# Patient Record
Sex: MALE | Race: BLACK OR AFRICAN AMERICAN | NOT HISPANIC OR LATINO | URBAN - METROPOLITAN AREA
[De-identification: names, ages, dates, MRNs, and addresses within clinical notes are randomized per-mention and may not be internally consistent; named-entity substitution may affect disease eponyms.]

---

## 2013-06-05 LAB — CRC RECOMMENDATION EXT: NORMAL

## 2017-04-20 ENCOUNTER — TELEPHONE (OUTPATIENT)
Dept: CARDIOLOGY | Facility: CLINIC | Age: 62
End: 2017-04-20

## 2017-04-20 DIAGNOSIS — R07.9 CHEST PAIN, UNSPECIFIED TYPE: Primary | ICD-10-CM

## 2017-04-21 ENCOUNTER — OFFICE VISIT (OUTPATIENT)
Dept: CARDIOLOGY | Facility: CLINIC | Age: 62
End: 2017-04-21
Payer: COMMERCIAL

## 2017-04-21 ENCOUNTER — HOSPITAL ENCOUNTER (OUTPATIENT)
Dept: CARDIOLOGY | Facility: CLINIC | Age: 62
Discharge: HOME | End: 2017-04-21
Payer: COMMERCIAL

## 2017-04-21 VITALS
HEIGHT: 69 IN | BODY MASS INDEX: 21.06 KG/M2 | SYSTOLIC BLOOD PRESSURE: 181 MMHG | DIASTOLIC BLOOD PRESSURE: 95 MMHG | HEART RATE: 71 BPM | WEIGHT: 142.19 LBS

## 2017-04-21 DIAGNOSIS — I10 ESSENTIAL HYPERTENSION: ICD-10-CM

## 2017-04-21 DIAGNOSIS — R07.9 CHEST PAIN, UNSPECIFIED TYPE: ICD-10-CM

## 2017-04-21 DIAGNOSIS — R55 SYNCOPE, UNSPECIFIED SYNCOPE TYPE: Primary | ICD-10-CM

## 2017-04-21 DIAGNOSIS — Z72.0 TOBACCO ABUSE: ICD-10-CM

## 2017-04-21 PROCEDURE — 3077F SYST BP >= 140 MM HG: CPT | Mod: S$GLB,,, | Performed by: INTERNAL MEDICINE

## 2017-04-21 PROCEDURE — 99999 PR PBB SHADOW E&M-EST. PATIENT-LVL III: CPT | Mod: PBBFAC,,, | Performed by: INTERNAL MEDICINE

## 2017-04-21 PROCEDURE — 3080F DIAST BP >= 90 MM HG: CPT | Mod: S$GLB,,, | Performed by: INTERNAL MEDICINE

## 2017-04-21 PROCEDURE — 93000 ELECTROCARDIOGRAM COMPLETE: CPT | Mod: S$GLB,,, | Performed by: INTERNAL MEDICINE

## 2017-04-21 PROCEDURE — 99204 OFFICE O/P NEW MOD 45 MIN: CPT | Mod: S$GLB,,, | Performed by: INTERNAL MEDICINE

## 2017-04-21 RX ORDER — AMLODIPINE BESYLATE 10 MG/1
10 TABLET ORAL DAILY
Qty: 30 TABLET | Refills: 11 | Status: SHIPPED | OUTPATIENT
Start: 2017-04-21

## 2017-04-21 RX ORDER — AMLODIPINE BESYLATE 10 MG/1
TABLET ORAL
COMMUNITY
Start: 2017-04-02 | End: 2017-04-21 | Stop reason: SDUPTHER

## 2017-04-21 RX ORDER — HYDROCHLOROTHIAZIDE 25 MG/1
25 TABLET ORAL DAILY
Qty: 30 TABLET | Refills: 11 | Status: SHIPPED | OUTPATIENT
Start: 2017-04-21 | End: 2017-06-13 | Stop reason: DRUGHIGH

## 2017-04-21 RX ORDER — OXYCODONE AND ACETAMINOPHEN 5; 325 MG/1; MG/1
TABLET ORAL
COMMUNITY
Start: 2017-04-02 | End: 2018-03-19 | Stop reason: SDUPTHER

## 2017-04-21 NOTE — PROGRESS NOTES
"Subjective:    Patient ID:  Krzysztof Pryor Sr. is a 62 y.o. male who presents for evaluation of Loss of Consciousness (Hospital d/c 4/1/17 [facility] ); Chest Pain (sharp pain ); and Dehydration      HPI  63 y/o AAM with hx of HTN here for f/u of syncopal episode. Seen at  after  working out in sun draining a pool when he progressively began to feel weak and then stood up to go get some water and passed out. Hit his heat and Ribs on L side. CT and Xray unrevealing and pt told he was dehydrated and given 3L NS. This is all per pt report as no documents available for review. Denies CP, palpitations, SOB, orthopnea or PND. States he is very active and never had CP or SOB prior to this event. He had normal Nuclear Stress last year at  just "for a checkup". He had previously been noncompliant with BP meds but since syncopal episode started taking Norvasc. Doesn't check BP. +tob. No family hx of MI or CVA.    Review of Systems   Constitution: Negative for chills and fever.   HENT: Negative for hoarse voice and sore throat.    Eyes: Negative for pain and redness.   Cardiovascular: Negative for chest pain, dyspnea on exertion, leg swelling and orthopnea.   Respiratory: Negative for cough and shortness of breath.    Endocrine: Negative for polydipsia and polyuria.   Hematologic/Lymphatic: Negative for bleeding problem. Does not bruise/bleed easily.   Skin: Negative for flushing and itching.   Musculoskeletal: Negative for joint pain and joint swelling.   Gastrointestinal: Negative for hematemesis, hematochezia and melena.   Genitourinary: Negative for dysuria and hematuria.   Neurological: Negative for light-headedness and loss of balance.        Objective:    Physical Exam   Constitutional: He is oriented to person, place, and time. He appears well-developed and well-nourished.   HENT:   Head: Normocephalic and atraumatic.   Mouth/Throat: No oropharyngeal exudate.   Eyes: EOM are normal. Pupils are equal, round, " and reactive to light.   Neck: Normal range of motion. Neck supple. No JVD present.   Cardiovascular: Normal rate and regular rhythm.  Exam reveals no friction rub.    No murmur heard.  Pulses:       Carotid pulses are 2+ on the right side, and 2+ on the left side.       Radial pulses are 2+ on the right side, and 2+ on the left side.        Femoral pulses are 2+ on the right side, and 2+ on the left side.       Dorsalis pedis pulses are 2+ on the right side, and 2+ on the left side.        Posterior tibial pulses are 2+ on the right side, and 2+ on the left side.   Pulmonary/Chest: Effort normal and breath sounds normal. No respiratory distress.   Abdominal: Soft. Bowel sounds are normal. He exhibits no distension.   Musculoskeletal: Normal range of motion. He exhibits no edema.   Neurological: He is alert and oriented to person, place, and time.   Skin: No rash noted.         Assessment:       Sycnope  - Sounds orthostatic in nature from dehydration. EKG with leftward axis and LAFB. No other hx of syncope, angina, or palpitations  - Will check 2D echo to ensure structurally normal heart    HTN  - SBP in 180's today but did not take Norvasc  - Rec cont norvasc and will add HCTZ  - Counseled on adequate hydration  - Check CBC, CMP, TSH for baseline    Primary prevention  - Check lipids to calculate ASCVD  - Rec Smoking Cessation and cont diet and exercise      RTC in 2 months for BP f/u. Will call with lab results

## 2017-04-21 NOTE — MR AVS SNAPSHOT
[facility]  [address]  [facility] [address]  Phone: [phone]                  Krzysztof Pryor Sr.   2017 10:00 AM   Office Visit    Description:  Male : 1955   Provider:  Jose Alfredo Bartlett MD   Department:  [facility]           Reason for Visit     Loss of Consciousness     Chest Pain     Dehydration           Diagnoses this Visit        Comments    Syncope, unspecified syncope type    -  Primary     Essential hypertension                To Do List           Goals (5 Years of Data)     None      Follow-Up and Disposition     Return in about 2 months (around 2017).       These Medications        Disp Refills Start End    hydrochlorothiazide (HYDRODIURIL) 25 MG tablet 30 tablet 11 2017     Take 1 tablet (25 mg total) by mouth once daily. - Oral    Pharmacy: [facility] [address] [place] [address] #: [phone]       amlodipine (NORVASC) 10 MG tablet 30 tablet 11 2017     Take 1 tablet (10 mg total) by mouth once daily. - Oral    Pharmacy: [facility] [address] AT [facility] Ph #: [phone]         [facility] On Call     [facility] Nurse Care Line - 24/ Assistance  Unless otherwise directed by your provider, please contact [facility], our nurse care line that is available for 24/7 assistance.     Registered nurses in the [facility] provide: appointment scheduling, clinical advisement, health education, and other advisory services.  Call: [phone] (toll free)               Medications           Message regarding Medications     Verify the changes and/or additions to your medication regime listed below are the same as discussed with your clinician today.  If any of these changes or additions are incorrect, please notify your healthcare provider.        START taking these NEW medications   "      Refills    hydrochlorothiazide (HYDRODIURIL) 25 MG tablet 11    Sig: Take 1 tablet (25 mg total) by mouth once daily.    Class: Normal    Route: Oral    amlodipine (NORVASC) 10 MG tablet 11    Sig: Take 1 tablet (10 mg total) by mouth once daily.    Class: Normal    Route: Oral           Verify that the below list of medications is an accurate representation of the medications you are currently taking.  If none reported, the list may be blank. If incorrect, please contact your healthcare provider. Carry this list with you in case of emergency.           Current Medications     amlodipine (NORVASC) 10 MG tablet Take 1 tablet (10 mg total) by mouth once daily.    oxycodone-acetaminophen (PERCOCET) 5-325 mg per tablet     hydrochlorothiazide (HYDRODIURIL) 25 MG tablet Take 1 tablet (25 mg total) by mouth once daily.           Clinical Reference Information           Your Vitals Were     BP Pulse Height Weight BMI    181/95 (BP Location: Left arm, Patient Position: Sitting, BP Method: Automatic) 71 5' 9" (1.753 m) 64.5 kg (142 lb 3.2 oz) 21 kg/m2      Blood Pressure          Most Recent Value    Right Arm BP - Sitting  184/87    Left Arm BP - Sitting  181/95    BP  (!)  181/95      Allergies as of 4/21/2017     Iodine And Iodide Containing Products      Immunizations Administered on Date of Encounter - 4/21/2017     None      Orders Placed During Today's Visit     Future Labs/Procedures Expected by Expires    CBC auto differential  4/21/2017 7/20/2017    Comprehensive metabolic panel  4/21/2017 7/20/2017    Magnesium  4/21/2017 7/20/2017    TSH  4/21/2017 6/20/2018    Lipid panel  6/2/2017 (Approximate) 7/2/2017    2D Echo w/ Color Flow Doppler  As directed 4/21/2018      [facility] Sign-Up     Activating your [facility] account is as easy as 1-2-3!     1) Visit [email or web address], select Sign Up Now, enter this activation code and your date of birth, then select Next.  [email or web address]  Expires: 6/5/2017  8:52 AM  "     2) Create a username and password to use when you visit [facility] in the future and select a security question in case you lose your password and select Next.    3) Enter your e-mail address and click Sign Up!    Additional Information  If you have questions, please e-mail [email or web address] or call [phone] to talk to our [facility] staff. Remember, [facility] is NOT to be used for urgent needs. For medical emergencies, dial 911.         Smoking Cessation     If you would like to quit smoking:   You may be eligible for free services if you are a [place] resident and started smoking cigarettes before September 1, 1988.  Call the Smoking Cessation Trust ([facility]) toll free at ([phone] 671-5332 or (268) 275-9537.   Call -800-QUIT-NOW if you do not meet the above criteria.   Contact us via email: [email or web address]   View our website for more information: [email or web address]        Language Assistance Services     ATTENTION: Language assistance services are available, free of charge. Please call [phone].      ATENCIÓN: Si habla español, tiene a tran disposición servicios gratuitos de asistencia lingüística. Llame al [phone].     BING Ý: N?u b?n nói Ti?ng Vi?t, có các d?ch v? h? tr? ngôn ng? mi?n phí dành cho b?n. G?i s? [phone].         [facility] complies with applicable Federal civil rights laws and does not discriminate on the basis of race, color, national origin, age, disability, or sex.

## 2017-04-24 ENCOUNTER — LAB VISIT (OUTPATIENT)
Dept: LAB | Facility: HOSPITAL | Age: 62
End: 2017-04-24
Payer: COMMERCIAL

## 2017-04-24 DIAGNOSIS — R55 SYNCOPE, UNSPECIFIED SYNCOPE TYPE: ICD-10-CM

## 2017-04-24 DIAGNOSIS — I10 ESSENTIAL HYPERTENSION: ICD-10-CM

## 2017-04-24 LAB
ALBUMIN SERPL BCP-MCNC: 4.1 G/DL
ALP SERPL-CCNC: 141 U/L
ALT SERPL W/O P-5'-P-CCNC: 14 U/L
ANION GAP SERPL CALC-SCNC: 9 MMOL/L
AST SERPL-CCNC: 26 U/L
BASOPHILS # BLD AUTO: 0.02 K/UL
BASOPHILS NFR BLD: 0.4 %
BILIRUB SERPL-MCNC: 0.5 MG/DL
BUN SERPL-MCNC: 11 MG/DL
CALCIUM SERPL-MCNC: 9.8 MG/DL
CHLORIDE SERPL-SCNC: 105 MMOL/L
CHOLEST/HDLC SERPL: 3.5 {RATIO}
CO2 SERPL-SCNC: 27 MMOL/L
CREAT SERPL-MCNC: 1 MG/DL
DIFFERENTIAL METHOD: ABNORMAL
EOSINOPHIL # BLD AUTO: 0.1 K/UL
EOSINOPHIL NFR BLD: 1.8 %
ERYTHROCYTE [DISTWIDTH] IN BLOOD BY AUTOMATED COUNT: 12.9 %
EST. GFR  (AFRICAN AMERICAN): >60 ML/MIN/1.73 M^2
EST. GFR  (NON AFRICAN AMERICAN): >60 ML/MIN/1.73 M^2
GLUCOSE SERPL-MCNC: 94 MG/DL
HCT VFR BLD AUTO: 37.3 %
HDL/CHOLESTEROL RATIO: 28.3 %
HDLC SERPL-MCNC: 173 MG/DL
HDLC SERPL-MCNC: 49 MG/DL
HGB BLD-MCNC: 12.8 G/DL
LDLC SERPL CALC-MCNC: 102.2 MG/DL
LYMPHOCYTES # BLD AUTO: 1.7 K/UL
LYMPHOCYTES NFR BLD: 35.1 %
MAGNESIUM SERPL-MCNC: 2.4 MG/DL
MCH RBC QN AUTO: 32.5 PG
MCHC RBC AUTO-ENTMCNC: 34.3 %
MCV RBC AUTO: 95 FL
MONOCYTES # BLD AUTO: 0.6 K/UL
MONOCYTES NFR BLD: 11.1 %
NEUTROPHILS # BLD AUTO: 2.6 K/UL
NEUTROPHILS NFR BLD: 51.6 %
NONHDLC SERPL-MCNC: 124 MG/DL
PLATELET # BLD AUTO: 246 K/UL
PMV BLD AUTO: 10 FL
POTASSIUM SERPL-SCNC: 4.5 MMOL/L
PROT SERPL-MCNC: 7.9 G/DL
RBC # BLD AUTO: 3.94 M/UL
SODIUM SERPL-SCNC: 141 MMOL/L
TRIGL SERPL-MCNC: 109 MG/DL
TSH SERPL DL<=0.005 MIU/L-ACNC: 0.93 UIU/ML
WBC # BLD AUTO: 4.96 K/UL

## 2017-04-24 PROCEDURE — 80053 COMPREHEN METABOLIC PANEL: CPT

## 2017-04-24 PROCEDURE — 84443 ASSAY THYROID STIM HORMONE: CPT

## 2017-04-24 PROCEDURE — 83735 ASSAY OF MAGNESIUM: CPT

## 2017-04-24 PROCEDURE — 36415 COLL VENOUS BLD VENIPUNCTURE: CPT

## 2017-04-24 PROCEDURE — 80061 LIPID PANEL: CPT

## 2017-04-24 PROCEDURE — 85025 COMPLETE CBC W/AUTO DIFF WBC: CPT

## 2017-04-28 ENCOUNTER — TELEPHONE (OUTPATIENT)
Dept: CARDIOLOGY | Facility: HOSPITAL | Age: 62
End: 2017-04-28

## 2017-04-28 DIAGNOSIS — I10 ESSENTIAL HYPERTENSION: Primary | ICD-10-CM

## 2017-04-28 RX ORDER — ATORVASTATIN CALCIUM 40 MG/1
40 TABLET, FILM COATED ORAL DAILY
Qty: 30 TABLET | Refills: 11 | Status: SHIPPED | OUTPATIENT
Start: 2017-04-28

## 2017-04-28 NOTE — TELEPHONE ENCOUNTER
Discussed labwork with pt. ASCVD=14.7% so will start lipitor 40mg QD. Will est care with PCP for anemia

## 2017-05-02 ENCOUNTER — HOSPITAL ENCOUNTER (OUTPATIENT)
Dept: CARDIOLOGY | Facility: CLINIC | Age: 62
Discharge: HOME | End: 2017-05-02
Payer: COMMERCIAL

## 2017-05-02 DIAGNOSIS — R55 SYNCOPE, UNSPECIFIED SYNCOPE TYPE: ICD-10-CM

## 2017-05-02 LAB
DIASTOLIC DYSFUNCTION: NO
ESTIMATED PA SYSTOLIC PRESSURE: 19.16
MITRAL VALVE MOBILITY: NORMAL
RETIRED EF AND QEF - SEE NOTES: 55 (ref 55–65)
TRICUSPID VALVE REGURGITATION: NORMAL

## 2017-05-02 PROCEDURE — 93306 TTE W/DOPPLER COMPLETE: CPT | Mod: S$GLB,,, | Performed by: INTERNAL MEDICINE

## 2017-05-03 ENCOUNTER — OFFICE VISIT (OUTPATIENT)
Dept: INTERNAL MEDICINE | Facility: CLINIC | Age: 62
End: 2017-05-03
Payer: COMMERCIAL

## 2017-05-03 VITALS
SYSTOLIC BLOOD PRESSURE: 156 MMHG | HEIGHT: 69 IN | DIASTOLIC BLOOD PRESSURE: 86 MMHG | HEART RATE: 90 BPM | BODY MASS INDEX: 20.9 KG/M2 | WEIGHT: 141.13 LBS

## 2017-05-03 DIAGNOSIS — I10 ESSENTIAL HYPERTENSION: ICD-10-CM

## 2017-05-03 DIAGNOSIS — D64.9 ANEMIA, UNSPECIFIED TYPE: ICD-10-CM

## 2017-05-03 DIAGNOSIS — Z76.89 ENCOUNTER TO ESTABLISH CARE WITH NEW DOCTOR: Primary | ICD-10-CM

## 2017-05-03 DIAGNOSIS — R63.4 WEIGHT LOSS: ICD-10-CM

## 2017-05-03 LAB
CREAT UR-MCNC: 225 MG/DL
MICROALBUMIN UR DL<=1MG/L-MCNC: 22 UG/ML
MICROALBUMIN/CREATININE RATIO: 9.8 UG/MG

## 2017-05-03 PROCEDURE — 3077F SYST BP >= 140 MM HG: CPT | Mod: S$GLB,,,

## 2017-05-03 PROCEDURE — 3079F DIAST BP 80-89 MM HG: CPT | Mod: S$GLB,,,

## 2017-05-03 PROCEDURE — 1160F RVW MEDS BY RX/DR IN RCRD: CPT | Mod: S$GLB,,,

## 2017-05-03 PROCEDURE — 99204 OFFICE O/P NEW MOD 45 MIN: CPT | Mod: S$GLB,,,

## 2017-05-03 PROCEDURE — 82570 ASSAY OF URINE CREATININE: CPT

## 2017-05-03 PROCEDURE — 99999 PR PBB SHADOW E&M-EST. PATIENT-LVL III: CPT | Mod: PBBFAC,,,

## 2017-05-03 RX ORDER — LISINOPRIL 5 MG/1
5 TABLET ORAL DAILY
Qty: 90 TABLET | Refills: 3 | Status: SHIPPED | OUTPATIENT
Start: 2017-05-03 | End: 2017-06-13 | Stop reason: ALTCHOICE

## 2017-05-03 NOTE — MR AVS SNAPSHOT
[facility]  [address]  [place] [address]  Phone: [phone]  Fax: [phone]                  Krzysztof Pryor SrMilla   5/3/2017 2:15 PM   Office Visit    Description:  Male : 1955   Provider:  Quinton Tucker MD   Department:  [facility]           Reason for Visit     Establish Care           Diagnoses this Visit        Comments    Encounter to establish care with new doctor    -  Primary     Essential hypertension         Anemia, unspecified type         Weight loss                To Do List           Goals (5 Years of Data)     None      Follow-Up and Disposition     Return in about 6 weeks (around 2017) for BP check.       These Medications        Disp Refills Start End    lisinopril (PRINIVIL,ZESTRIL) 5 MG tablet 90 tablet 3 5/3/2017 5/3/2018    Take 1 tablet (5 mg total) by mouth once daily. - Oral    Pharmacy: [facility] [address] [place] [address] AT [facility] #: [phone]         [facility]     [facility] On Call Nurse Care Line -  Assistance  Unless otherwise directed by your provider, please contact [facility], our nurse care line that is available for  assistance.     Registered nurses in the [facility] provide: appointment scheduling, clinical advisement, health education, and other advisory services.  Call: [phone] (toll free)               Medications           Message regarding Medications     Verify the changes and/or additions to your medication regime listed below are the same as discussed with your clinician today.  If any of these changes or additions are incorrect, please notify your healthcare provider.        START taking these NEW medications        Refills    lisinopril (PRINIVIL,ZESTRIL) 5 MG tablet 3    Sig: Take 1 tablet (5 mg total) by mouth once daily.    Class: Normal    Route: Oral           Verify that the below list of  "medications is an accurate representation of the medications you are currently taking.  If none reported, the list may be blank. If incorrect, please contact your healthcare provider. Carry this list with you in case of emergency.           Current Medications     amlodipine (NORVASC) 10 MG tablet Take 1 tablet (10 mg total) by mouth once daily.    atorvastatin (LIPITOR) 40 MG tablet Take 1 tablet (40 mg total) by mouth once daily.    hydrochlorothiazide (HYDRODIURIL) 25 MG tablet Take 1 tablet (25 mg total) by mouth once daily.    lisinopril (PRINIVIL,ZESTRIL) 5 MG tablet Take 1 tablet (5 mg total) by mouth once daily.    oxycodone-acetaminophen (PERCOCET) 5-325 mg per tablet            Clinical Reference Information           Your Vitals Were     BP Pulse Height Weight BMI    156/86 (BP Location: Right arm, Patient Position: Sitting, BP Method: Automatic) 90 5' 9" (1.753 m) 64 kg (141 lb 1.5 oz) 20.84 kg/m2      Blood Pressure          Most Recent Value    BP  (!)  156/86      Allergies as of 5/3/2017     Iodine And Iodide Containing Products      Immunizations Administered on Date of Encounter - 5/3/2017     Name Date Dose VIS Date Route    Pneumococcal Polysaccharide - 23 Valent  Incomplete 0.5 mL 4/24/2015 Intramuscular    TD  Incomplete 0.5 mL 2/24/2015 Intramuscular      Orders Placed During Today's Visit      Normal Orders This Visit    Ambulatory referral to Infectious Disease Injection Dept     MICROALBUMIN / CREATININE RATIO URINE     Pneumococcal Polysaccharide Vaccine (23 Valent) (SQ/IM)     Td Vaccinie     Future Labs/Procedures Expected by Expires    CT Chest Lung Screening Low Dose  5/3/2017 5/3/2018    Ferritin  5/3/2017 7/2/2018    HEPATITIS C ANTIBODY  5/3/2017 7/2/2018    IRON AND TIBC  5/3/2017 7/2/2018    Occult blood x 1, stool  5/3/2017 5/3/2018    Occult blood x 1, stool  5/3/2017 5/3/2018    Occult blood x 1, stool  5/3/2017 5/3/2018    Vitamin D  5/3/2017 7/2/2018    BASIC METABOLIC PANEL  " 6/7/2017 7/2/2018      Smoking Cessation     If you would like to quit smoking:   You may be eligible for free services if you are a [place] resident and started smoking cigarettes before September 1, 1988.  Call the Smoking Cessation Trust ([facility]) toll free at ([phone] or [phone]) [phone].   Call 1-800-QUIT-NOW if you do not meet the above criteria.   Contact us via email: [email or web address]   View our website for more information: [email or web address]        Language Assistance Services     ATTENTION: Language assistance services are available, free of charge. Please call [phone].      ATENCIÓN: Si habla español, tiene a tran disposición servicios gratuitos de asistencia lingüística. Llame al [phone].     CHÚ Ý: N?u b?n nói Ti?ng Vi?t, có các d?ch v? h? tr? ngôn ng? mi?n phí dành cho b?n. G?i s? [phone].         [facility] complies with applicable Federal civil rights laws and does not discriminate on the basis of race, color, national origin, age, disability, or sex.

## 2017-05-03 NOTE — PROGRESS NOTES
I have personally interviewed and examined this patient and agree with resident's note as below.   Mirela Tucker MD

## 2017-05-03 NOTE — PROGRESS NOTES
"Subjective:       Patient ID: Krzysztof Pryor Sr. is a 62 y.o. male.    Chief Complaint: Establish Care    HPI     Krzysztof Pryor Sr. is a 62 y.o. male with a pMHx of hypertension who presents to [facility] for establishment of care.  He recently was seen by [facility] cardiology in regards to a syncopal event, which was ruled likely due to dehydration.  Labs at that time revealed an anemia of 12.6, where he was referred to [facility] primary care for further workup.  He states that he is feeling well without significant changes.  He does however note a 20 pound weight loss within the past year.  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  He notes that while he is more physically active, it does not relate to his weight loss.  He denies any appetite changes, night sweats, fevers, or other constitutional symptoms.  He has had a colonoscopy 1 year ago at , which he states was "okay", but may have had a 5 year follow up.  In addition, he has smoked 0.5 packs of cigarettes for 45 years.      Review of Systems   Constitutional: Positive for unexpected weight change. Negative for appetite change, fatigue and fever.   HENT: Negative for sinus pressure and sore throat.    Respiratory: Negative for cough, chest tightness, shortness of breath and wheezing.    Cardiovascular: Negative for chest pain and palpitations.   Gastrointestinal: Negative for abdominal distention, abdominal pain, blood in stool, constipation and diarrhea.   Endocrine: Negative for cold intolerance, heat intolerance, polydipsia and polyphagia.   Genitourinary: Negative for difficulty urinating, dysuria and hematuria.   Musculoskeletal: Positive for arthralgias (R rib pain from syncopal event leading to fall). Negative for back pain.   Skin: Negative for pallor and rash.   Neurological: Positive for syncope (once on 4/1/2017). Negative for weakness, light-headedness and headaches. " "  Hematological: Negative for adenopathy. Does not bruise/bleed easily.   Psychiatric/Behavioral: Negative for agitation and confusion.       Past Medical History:   Diagnosis Date    Hypertension        No past surgical history on file.    Social History     Social History    Marital status:      Spouse name: N/A    Number of children: N/A    Years of education: N/A     Occupational History    retired     law enforcement       Social History Main Topics    Smoking status: Current Every Day Smoker     Packs/day: 0.50     Years: 45.00    Smokeless tobacco: None    Alcohol use 7.2 oz/week     12 Cans of beer per week      Comment: social    Drug use: No    Sexual activity: Yes     Partners: Female     Other Topics Concern    None     Social History Narrative    None       Family History   Problem Relation Age of Onset    Diabetes Mother     Cancer Father     Cancer Sister     No Known Problems Paternal Grandmother        Review of patient's allergies indicates:   Allergen Reactions    Iodine and iodide containing products Hives       Objective:       Vitals:    05/03/17 1345   BP: (!) 156/86   BP Location: Right arm   Patient Position: Sitting   BP Method: Automatic   Pulse: 90   Weight: 64 kg (141 lb 1.5 oz)   Height: 5' 9" (1.753 m)       Physical Exam   Constitutional: He is oriented to person, place, and time. He appears well-developed and well-nourished. No distress.   HENT:   Head: Normocephalic and atraumatic.   Right Ear: External ear normal.   Left Ear: External ear normal.   Mouth/Throat: No oropharyngeal exudate.   Eyes: Pupils are equal, round, and reactive to light. No scleral icterus.   Neck: Normal range of motion. No tracheal deviation present. No thyromegaly present.   Cardiovascular: Normal rate, regular rhythm and normal heart sounds.    Pulmonary/Chest: Effort normal and breath sounds normal. No respiratory distress. He has no wheezes.   Abdominal: Soft. Bowel sounds are " normal. He exhibits no distension. There is no tenderness.   Musculoskeletal: Normal range of motion. He exhibits no edema.   No tenderness to R rib    Lymphadenopathy:     He has no cervical adenopathy.   Neurological: He is alert and oriented to person, place, and time. No cranial nerve deficit.   Skin: Skin is warm and dry. He is not diaphoretic. No erythema.   Psychiatric: He has a normal mood and affect. His behavior is normal.   Vitals reviewed.      Results for LILIYA RIOS SR. (MRN [id number]) as of 5/3/2017 14:59   Ref. Range 4/21/2017 08:55 4/24/2017 07:35 5/2/2017 13:00   WBC Latest Ref Range: 3.90 - 12.70 K/uL  4.96    RBC Latest Ref Range: 4.60 - 6.20 M/uL  3.94 (L)    Hemoglobin Latest Ref Range: 14.0 - 18.0 g/dL  12.8 (L)    Hematocrit Latest Ref Range: 40.0 - 54.0 %  37.3 (L)    MCV Latest Ref Range: 82 - 98 fL  95    MCH Latest Ref Range: 27.0 - 31.0 pg  32.5 (H)    MCHC Latest Ref Range: 32.0 - 36.0 %  34.3    RDW Latest Ref Range: 11.5 - 14.5 %  12.9    Platelets Latest Ref Range: 150 - 350 K/uL  246    MPV Latest Ref Range: 9.2 - 12.9 fL  10.0    Gran% Latest Ref Range: 38.0 - 73.0 %  51.6    Gran # Latest Ref Range: 1.8 - 7.7 K/uL  2.6    Lymph% Latest Ref Range: 18.0 - 48.0 %  35.1    Lymph # Latest Ref Range: 1.0 - 4.8 K/uL  1.7    Mono% Latest Ref Range: 4.0 - 15.0 %  11.1    Mono # Latest Ref Range: 0.3 - 1.0 K/uL  0.6    Eosinophil% Latest Ref Range: 0.0 - 8.0 %  1.8    Eos # Latest Ref Range: 0.0 - 0.5 K/uL  0.1    Basophil% Latest Ref Range: 0.0 - 1.9 %  0.4    Baso # Latest Ref Range: 0.00 - 0.20 K/uL  0.02    Sodium Latest Ref Range: 136 - 145 mmol/L  141    Potassium Latest Ref Range: 3.5 - 5.1 mmol/L  4.5    Chloride Latest Ref Range: 95 - 110 mmol/L  105    CO2 Latest Ref Range: 23 - 29 mmol/L  27    Anion Gap Latest Ref Range: 8 - 16 mmol/L  9    BUN, Bld Latest Ref Range: 8 - 23 mg/dL  11    Creatinine Latest Ref Range: 0.5 - 1.4 mg/dL  1.0    eGFR if non  Latest Ref  Range: >60 mL/min/1.73 m^2  >60.0    eGFR if African American Latest Ref Range: >60 mL/min/1.73 m^2  >60.0    Glucose Latest Ref Range: 70 - 110 mg/dL  94    Calcium Latest Ref Range: 8.7 - 10.5 mg/dL  9.8    Magnesium Latest Ref Range: 1.6 - 2.6 mg/dL  2.4    Alkaline Phosphatase Latest Ref Range: 55 - 135 U/L  141 (H)    Total Protein Latest Ref Range: 6.0 - 8.4 g/dL  7.9    Albumin Latest Ref Range: 3.5 - 5.2 g/dL  4.1    Total Bilirubin Latest Ref Range: 0.1 - 1.0 mg/dL  0.5    AST Latest Ref Range: 10 - 40 U/L  26    ALT Latest Ref Range: 10 - 44 U/L  14    Triglycerides Latest Ref Range: 30 - 150 mg/dL  109    Cholesterol Latest Ref Range: 120 - 199 mg/dL  173    HDL Latest Ref Range: 40 - 75 mg/dL  49    LDL Cholesterol Latest Ref Range: 63.0 - 159.0 mg/dL  102.2    Total Cholesterol/HDL Ratio Latest Ref Range: 2.0 - 5.0   3.5    TSH Latest Ref Range: 0.400 - 4.000 uIU/mL  0.934        2D echo 5/1/2017    Results for KRZYSZTOF RIOS SR. (MRN [id number]) as of 5/3/2017 14:59   Ref. Range 4/21/2017 08:55 4/24/2017 07:35 5/2/2017 13:00   Diastolic Dysfunction Unknown   No   Differential Method Unknown  Automated    EF Latest Ref Range: 55 - 65    55   HDL/Chol Ratio Latest Ref Range: 20.0 - 50.0 %  28.3    Mitral Valve Mobility Unknown   NORMAL   Non-HDL Cholesterol Latest Units: mg/dL  124    Est. PA Systolic Pressure Unknown   19.16   Tricuspid Valve Regurgitation Unknown   TRIVIAL       Assessment:       1. Encounter to establish care with new doctor    2. Essential hypertension    3. Anemia, unspecified type    4. Weight loss    5.      Tobacco Use   Plan:       Krzysztof was seen today for establish care.    Diagnoses and all orders for this visit:    Encounter to establish care with new doctor  -     CT Chest Lung Screening Low Dose; Future  -     Vitamin D; Future  -     BASIC METABOLIC PANEL; Future  -     Td Vaccinie  -     Pneumococcal Polysaccharide Vaccine (23 Valent) (SQ/IM)  -     Ambulatory referral to  Infectious Disease Injection Dept  -     HEPATITIS C ANTIBODY; Future  -     Occult blood x 1, stool; Future  -     Occult blood x 1, stool; Future  -     Occult blood x 1, stool; Future  - Establishing care at [facility].  Will order as noted above.  Ct chest as patient has 30+ years of smoking.  Vitamin D to assess Alk Phos elevation of 145.  BMP as patient now starting lisinopril, sent to pharmacy.  Will obtain records from  via patient regarding colonscopy, patient to present information in 6 week follow up.  For now, will order hemeoccult and have patient return cards to lab.  Will also order hepatitis C antibody for health screen and iron panel to work up anemia.  Will have patient follow up in 6 weeks to assess BP control    Essential hypertension  -     MICROALBUMIN / CREATININE RATIO URINE  -     lisinopril (PRINIVIL,ZESTRIL) 5 MG tablet; Take 1 tablet (5 mg total) by mouth once daily.  - Will have patient follow up in 6 weeks for noted BP control.      Anemia, unspecified type  -     IRON AND TIBC; Future  -     Ferritin; Future  -     Occult blood x 1, stool; Future  -     Occult blood x 1, stool; Future  -     Occult blood x 1, stool; Future  - Normocytic anemia of unknown cause.  Will order iron panel and hemeoccult.  Colonoscopy per patient's previous notes, or at least provide name of provider who performed exam.  Will request documentation at next visit otherwise.      Weight loss  -     CT Chest Lung Screening Low Dose; Future  - Given 20 lb weight loss and smoking history, will have low dose CT chest for screening purposes.     RTC in 6 weeks for assessment of BP control.    Discussed with Staff, Dr. Tucker.     Quinton Tucker MD  Internal Medicine PGY-1  [phone]

## 2017-05-09 ENCOUNTER — HOSPITAL ENCOUNTER (OUTPATIENT)
Dept: RADIOLOGY | Facility: HOSPITAL | Age: 62
Discharge: HOME | End: 2017-05-09

## 2017-05-09 ENCOUNTER — TELEPHONE (OUTPATIENT)
Dept: INTERNAL MEDICINE | Facility: CLINIC | Age: 62
End: 2017-05-09

## 2017-05-09 ENCOUNTER — LAB VISIT (OUTPATIENT)
Dept: LAB | Facility: HOSPITAL | Age: 62
End: 2017-05-09
Payer: COMMERCIAL

## 2017-05-09 ENCOUNTER — CLINICAL SUPPORT (OUTPATIENT)
Dept: INFECTIOUS DISEASES | Facility: CLINIC | Age: 62
End: 2017-05-09
Payer: COMMERCIAL

## 2017-05-09 DIAGNOSIS — Z76.89 ENCOUNTER TO ESTABLISH CARE WITH NEW DOCTOR: ICD-10-CM

## 2017-05-09 DIAGNOSIS — D64.9 ANEMIA, UNSPECIFIED TYPE: ICD-10-CM

## 2017-05-09 DIAGNOSIS — R63.4 WEIGHT LOSS: ICD-10-CM

## 2017-05-09 DIAGNOSIS — Z23 NEED FOR VACCINATION: Primary | ICD-10-CM

## 2017-05-09 LAB
OB PNL STL: NEGATIVE

## 2017-05-09 PROCEDURE — 82272 OCCULT BLD FECES 1-3 TESTS: CPT | Mod: 91

## 2017-05-09 PROCEDURE — 99999 PR PBB SHADOW E&M-EST. PATIENT-LVL I: CPT | Mod: PBBFAC,,,

## 2017-05-09 PROCEDURE — 90471 IMMUNIZATION ADMIN: CPT | Mod: S$GLB,,, | Performed by: INTERNAL MEDICINE

## 2017-05-09 PROCEDURE — 90472 IMMUNIZATION ADMIN EACH ADD: CPT | Mod: S$GLB,,, | Performed by: INTERNAL MEDICINE

## 2017-05-09 PROCEDURE — 90732 PPSV23 VACC 2 YRS+ SUBQ/IM: CPT | Mod: S$GLB,,, | Performed by: INTERNAL MEDICINE

## 2017-05-09 PROCEDURE — 90714 TD VACC NO PRESV 7 YRS+ IM: CPT | Mod: S$GLB,,, | Performed by: INTERNAL MEDICINE

## 2017-05-09 PROCEDURE — 76497 UNLISTED CT PROCEDURE: CPT | Mod: TC

## 2017-05-09 NOTE — TELEPHONE ENCOUNTER
Patient called x 2 in regards to low dose CT chest given extensive smoking history.  Unable to reach patient, but left a message on his answering machine regarding possible call back at a later time.  Message left, informing further workup after imaging.  Would plan to discuss,in particular, an ultrasound regarding a 1.3 cm thyroid mass, plain films of his spine given CT scan, and PFTs given lung changes and smoking history.  Will try back at another time.    Quinton Tucker MD  Internal Medicine PGY-1  [phone]

## 2017-05-11 ENCOUNTER — TELEPHONE (OUTPATIENT)
Dept: INTERNAL MEDICINE | Facility: CLINIC | Age: 62
End: 2017-05-11

## 2017-05-11 DIAGNOSIS — Z72.0 TOBACCO ABUSE: Primary | ICD-10-CM

## 2017-05-11 DIAGNOSIS — E07.89 THYROID MASS OF UNCLEAR ETIOLOGY: Primary | ICD-10-CM

## 2017-05-11 DIAGNOSIS — R91.8 ABNORMAL CT LUNG SCREENING: ICD-10-CM

## 2017-05-11 NOTE — TELEPHONE ENCOUNTER
----- Message from Mehrdad Perkins MA sent at 5/11/2017 10:00 AM CDT -----  Contact: [phone]  Quinton Tucker MD    Type: Returning a call    Who left a message? Quinton Tucker MD    When did the practice call? 5/9/17    Comments: Please advise and call. Thanks!

## 2017-05-12 NOTE — TELEPHONE ENCOUNTER
Patient called in 1315.  Notified of abnormal CT scan, will order U/S thyroid and X-ray of thoracic spine first.  Will consider PFTs at next visit when he returns for BP check.  Patient also states that his lisinopril cost 100 dollars and would like to have the generic sent over.  Will assess if there are other medications that can be given.  In addition, informed patient to continue assessing blood pressure at this time if he cannot get the medication.  Informed patient to call us if there are issues with his blood pressure or if his SBP > 150.  He states that he has not gone that high in a long time.  Patient also inquires if he can return to work, where he says he is currently feeling well.  Informed him that he may return to his part time work as tolerated.  All questions were answered to his satisfaction.    Quinton Tucker MD  Internal Medicine PGY-1  [phone]

## 2017-05-26 ENCOUNTER — HOSPITAL ENCOUNTER (OUTPATIENT)
Dept: RADIOLOGY | Facility: HOSPITAL | Age: 62
Discharge: HOME | End: 2017-05-26
Payer: COMMERCIAL

## 2017-05-26 DIAGNOSIS — R91.8 ABNORMAL CT LUNG SCREENING: ICD-10-CM

## 2017-05-26 DIAGNOSIS — E07.89 THYROID MASS OF UNCLEAR ETIOLOGY: ICD-10-CM

## 2017-05-26 PROCEDURE — 72070 X-RAY EXAM THORAC SPINE 2VWS: CPT | Mod: 26,,, | Performed by: RADIOLOGY

## 2017-05-26 PROCEDURE — 76536 US EXAM OF HEAD AND NECK: CPT | Mod: TC

## 2017-05-26 PROCEDURE — 72070 X-RAY EXAM THORAC SPINE 2VWS: CPT | Mod: TC

## 2017-05-26 PROCEDURE — 76536 US EXAM OF HEAD AND NECK: CPT | Mod: 26,,, | Performed by: RADIOLOGY

## 2017-06-02 ENCOUNTER — PATIENT MESSAGE (OUTPATIENT)
Dept: CARDIOLOGY | Facility: HOSPITAL | Age: 62
End: 2017-06-02

## 2017-06-06 ENCOUNTER — LAB VISIT (OUTPATIENT)
Dept: LAB | Facility: HOSPITAL | Age: 62
End: 2017-06-06
Payer: COMMERCIAL

## 2017-06-06 DIAGNOSIS — D64.9 ANEMIA, UNSPECIFIED TYPE: ICD-10-CM

## 2017-06-06 DIAGNOSIS — Z76.89 ENCOUNTER TO ESTABLISH CARE WITH NEW DOCTOR: ICD-10-CM

## 2017-06-06 LAB
25(OH)D3+25(OH)D2 SERPL-MCNC: 48 NG/ML
ANION GAP SERPL CALC-SCNC: 11 MMOL/L
BUN SERPL-MCNC: 7 MG/DL
CALCIUM SERPL-MCNC: 9.9 MG/DL
CHLORIDE SERPL-SCNC: 103 MMOL/L
CO2 SERPL-SCNC: 23 MMOL/L
CREAT SERPL-MCNC: 1 MG/DL
EST. GFR  (AFRICAN AMERICAN): >60 ML/MIN/1.73 M^2
EST. GFR  (NON AFRICAN AMERICAN): >60 ML/MIN/1.73 M^2
FERRITIN SERPL-MCNC: 551 NG/ML
GLUCOSE SERPL-MCNC: 137 MG/DL
HCV AB SERPL QL IA: NEGATIVE
IRON SERPL-MCNC: 148 UG/DL
POTASSIUM SERPL-SCNC: 3.8 MMOL/L
SATURATED IRON: 40 %
SODIUM SERPL-SCNC: 137 MMOL/L
TOTAL IRON BINDING CAPACITY: 371 UG/DL
TRANSFERRIN SERPL-MCNC: 251 MG/DL

## 2017-06-06 PROCEDURE — 83540 ASSAY OF IRON: CPT

## 2017-06-06 PROCEDURE — 82728 ASSAY OF FERRITIN: CPT

## 2017-06-06 PROCEDURE — 82306 VITAMIN D 25 HYDROXY: CPT

## 2017-06-06 PROCEDURE — 36415 COLL VENOUS BLD VENIPUNCTURE: CPT

## 2017-06-06 PROCEDURE — 86803 HEPATITIS C AB TEST: CPT

## 2017-06-06 PROCEDURE — 80048 BASIC METABOLIC PNL TOTAL CA: CPT

## 2017-06-13 ENCOUNTER — OFFICE VISIT (OUTPATIENT)
Dept: INTERNAL MEDICINE | Facility: CLINIC | Age: 62
End: 2017-06-13
Payer: COMMERCIAL

## 2017-06-13 VITALS
HEART RATE: 64 BPM | BODY MASS INDEX: 20.63 KG/M2 | HEIGHT: 69 IN | SYSTOLIC BLOOD PRESSURE: 146 MMHG | DIASTOLIC BLOOD PRESSURE: 75 MMHG | OXYGEN SATURATION: 99 % | WEIGHT: 139.31 LBS

## 2017-06-13 DIAGNOSIS — I10 ESSENTIAL HYPERTENSION: Primary | ICD-10-CM

## 2017-06-13 DIAGNOSIS — Z72.0 TOBACCO ABUSE: ICD-10-CM

## 2017-06-13 PROCEDURE — 99213 OFFICE O/P EST LOW 20 MIN: CPT | Mod: S$GLB,,,

## 2017-06-13 PROCEDURE — 99999 PR PBB SHADOW E&M-EST. PATIENT-LVL IV: CPT | Mod: PBBFAC,,,

## 2017-06-13 RX ORDER — TRIAMTERENE/HYDROCHLOROTHIAZID 37.5-25 MG
1 TABLET ORAL DAILY
Qty: 30 TABLET | Refills: 5 | Status: SHIPPED | OUTPATIENT
Start: 2017-06-13 | End: 2018-06-13

## 2017-06-13 NOTE — PATIENT INSTRUCTIONS
Please stop taking the hydrochlorothiazide and the lisinopril.  Instead, take the Triamterene-hydrochlorothiazide pill.  Prescription is printed out.

## 2017-06-13 NOTE — PROGRESS NOTES
"Subjective:       Patient ID: Krzysztof Pryor Sr. is a 62 y.o. male.    Chief Complaint: Follow-up (essential HTN)    HPI     Krzysztof Pryor Sr. is a 62 y.o. male with a pMHx of hypertension, HLD, and tobacco use who presents for a follow up for his blood pressure.  He states that he is doing well with no significant complaints.  His blood pressures measured at home were ranged between -140.  He notes that the lisinopril is too expensive for him, and he has only been taking the medication every other day.  Otherwise, he does note new onset impotence with the medication.  Denies fevers, chills, N/V, shortness of breath, chest or abdominal pains.  Notes no significant issues with urinary stream, urgency, consistency, or initiation.      Review of Systems   Constitutional: Negative for chills, diaphoresis, fatigue and fever.   HENT: Negative for sinus pressure and sore throat.    Respiratory: Negative for cough and shortness of breath.    Cardiovascular: Negative for chest pain.   Gastrointestinal: Negative for abdominal distention, abdominal pain, constipation, diarrhea, nausea and vomiting.   Genitourinary: Negative for difficulty urinating, dysuria and flank pain.   Musculoskeletal: Negative for arthralgias and myalgias.   Skin: Negative for pallor and rash.   Neurological: Negative for syncope, numbness and headaches.   Psychiatric/Behavioral: Negative for confusion and decreased concentration.       Objective:       Vitals:    06/13/17 1320   BP: (!) 146/75   Pulse: 64   SpO2: 99%   Weight: 63.2 kg (139 lb 5.3 oz)   Height: 5' 9" (1.753 m)       Physical Exam   Constitutional: He is oriented to person, place, and time. He appears well-developed and well-nourished. No distress.   HENT:   Head: Normocephalic and atraumatic.   Mouth/Throat: No oropharyngeal exudate.   Eyes: Pupils are equal, round, and reactive to light. No scleral icterus.   Neck: Normal range of motion.   Cardiovascular: Normal rate, regular " rhythm and normal heart sounds.    Pulmonary/Chest: Effort normal and breath sounds normal. No respiratory distress. He has no wheezes.   Abdominal: Soft. Bowel sounds are normal. He exhibits no distension. There is no tenderness.   Musculoskeletal: Normal range of motion. He exhibits no edema.   Neurological: He is alert and oriented to person, place, and time. No cranial nerve deficit.   Skin: Skin is warm and dry. He is not diaphoretic. No erythema.   Psychiatric: He has a normal mood and affect. His behavior is normal.   Vitals reviewed.      Labs and imaging reviewed.      Assessment:       1. Essential hypertension    2. Tobacco abuse        Plan:       Krzysztof was seen today for follow-up.    Diagnoses and all orders for this visit:    Essential hypertension         - Discontinued lisinopril due to impotence.  Switched HCTZ to Maxzide as noted below.  Informed patient to purchase own BP machine to help with recording.  Informed patient to call if there is any abnormalities or new symptoms.  Otherwise, patient can follow up in 1 year.  All labs and imaging discussed with patient.      Tobacco abuse        - Patient notes smoking cessation at this point.  Continue cessation.      Other orders  -     triamterene-hydrochlorothiazide 37.5-25 mg (MAXZIDE-25) 37.5-25 mg per tablet; Take 1 tablet by mouth once daily.      RTC in 1 year or as needed if any issues arise.     Discussed with Staff, Dr. Fernandes.    Quinton Tucker MD  Internal Medicine PGY-1  [phone]

## 2017-06-15 NOTE — PROGRESS NOTES
Preceptor note    Patient's history and physical discussed, please refer to resident physician's note for specific details. Pt seen with resident physician. Medical record reviewed. I agree with resident's assessment and plan.

## 2017-12-27 ENCOUNTER — TELEPHONE (OUTPATIENT)
Dept: ORTHOPEDICS | Facility: CLINIC | Age: 62
End: 2017-12-27

## 2017-12-27 NOTE — TELEPHONE ENCOUNTER
Spoke with patient to confirm received medical records. Patient was made aware of date, time, and location. Verbalized understanding.    ----- Message from Mike Simpson sent at 12/27/2017  2:56 PM CST -----  Contact: [phone] self  Patient called in requesting to speak with you. Patient prefers to speak with a nurse. Please advise.

## 2017-12-27 NOTE — TELEPHONE ENCOUNTER
----- Message from Josefa Tucker sent at 12/27/2017 10:33 AM CST -----  Contact: [phone]/self  Patient requesting to speak with you regarding paperwork sent over last week. Please call and advise.

## 2018-01-10 ENCOUNTER — HOSPITAL ENCOUNTER (OUTPATIENT)
Dept: RADIOLOGY | Facility: OTHER | Age: 63
Discharge: HOME | End: 2018-01-10
Attending: PHYSICIAN ASSISTANT
Payer: COMMERCIAL

## 2018-01-10 ENCOUNTER — OFFICE VISIT (OUTPATIENT)
Dept: ORTHOPEDICS | Facility: CLINIC | Age: 63
End: 2018-01-10
Attending: ORTHOPAEDIC SURGERY
Payer: COMMERCIAL

## 2018-01-10 VITALS — BODY MASS INDEX: 20.59 KG/M2 | WEIGHT: 139 LBS | HEIGHT: 69 IN

## 2018-01-10 DIAGNOSIS — M79.642 LEFT HAND PAIN: ICD-10-CM

## 2018-01-10 DIAGNOSIS — R29.898 WEAKNESS OF LEFT HAND: Primary | ICD-10-CM

## 2018-01-10 DIAGNOSIS — S64.12XA INJURY OF LEFT MEDIAN NERVE AT WRIST, INITIAL ENCOUNTER: ICD-10-CM

## 2018-01-10 DIAGNOSIS — M79.642 LEFT HAND PAIN: Primary | ICD-10-CM

## 2018-01-10 PROCEDURE — 99203 OFFICE O/P NEW LOW 30 MIN: CPT | Mod: S$GLB,,, | Performed by: ORTHOPAEDIC SURGERY

## 2018-01-10 PROCEDURE — 73130 X-RAY EXAM OF HAND: CPT | Mod: TC,FY,LT

## 2018-01-10 PROCEDURE — 73130 X-RAY EXAM OF HAND: CPT | Mod: 26,LT,, | Performed by: RADIOLOGY

## 2018-01-10 PROCEDURE — 99999 PR PBB SHADOW E&M-EST. PATIENT-LVL II: CPT | Mod: PBBFAC,,, | Performed by: ORTHOPAEDIC SURGERY

## 2018-01-10 NOTE — PROGRESS NOTES
INITIAL VISIT HISTORY:  A 62-year-old male presents for evaluation of left hand   injury.  Apparently, he cut his left hand on the volar wrist area about eight   months ago in June 2017.  At that time, he had surgery performed by Dr. Shelley in   [place] who apparently repaired the tissues, which may have included the nerves   and tendons.    Since then, he has had physical therapy for a while several months any way and   has finished therapy because of failure to improve.  He continues to report   limited use of left hand.  His main complaint is numbness and tingling in the   hand in the left thumb, index and middle finger.  He also has limited gripping   and limited flexion.    He is on pain medicine as well.    PAST MEDICAL HISTORY:  Significant for hypertension.    PAST SURGICAL HISTORY:  Left hand surgery.    FAMILY HISTORY:  Positive for cancer and diabetes.    SOCIAL HISTORY:  The patient smokes daily, half pack per day, 20-pack-year   history.  Drinks about 12 beers per week.    REVIEW OF SYSTEMS:  Negative fever, chills, rashes.    CURRENT MEDICATIONS:  Reviewed on the chart.    ALLERGIES:  Iodine.    PHYSICAL EXAMINATION:  GENERAL:  Well-developed, well-nourished male in no acute distress, alert and   oriented x3.  EXTREMITIES:  Examination of the upper extremities significant for the left hand   demonstrating a healed volar laceration, which extends from the midline volar   to the ulnar side proximal.  He has some mild swelling in that area and a   positive Tinel sign, both proximal and distal to the laceration site.  Range of   motion of the wrist is full.  Range of motion of fingers is limited.  He has   extension contractures of the index and middle finger and limited flexion into   the palm.  Decreased sensation, left thumb, index and middle finger and he has   some atrophy of the thenar muscle, but not complete atrophy.  He does have signs   of intrinsic minus in the thumb, index and middle  finger.    X-RAYS:  AP and lateral, left hand, demonstrate diffuse osteopenia, which is   severe throughout the hand and carpus.    IMPRESSION:  1.  Median nerve injury, left hand, chronic.  2.  Probable complex regional pain syndrome type 1 due to nerve injury.  3.  Extension contractures, limited flexion, left hand.    PLAN:  I explained the nature of the injury to the patient.  He will most likely   require extensive treatment including probable revision surgery to evaluate the   nerve, possible nerve graft.    Before proceeding, I would like to go ahead and get a nerve conduction study   ordered today for the left hand.  In the meantime, continue regular activities,   exercises to try to regain some of his motion and Ultram given for pain.  Follow   up after the nerve test is complete.      TEVIN  dd: 01/10/2018 14:59:29 (CST)  td: 01/11/2018 09:07:55 (CST)  Doc ID   #[id number]  Job ID #[id number]    CC:

## 2018-01-10 NOTE — LETTER
January 10, 2018        Quinton Tucker MD  [address]  [facility] [address]             [facility]  [address], [address]  [facility] [address]  Phone: [phone]   Patient: Krzysztof Pryor Sr.   MR Number: [id number]   YOB: 1955   Date of Visit: 1/10/2018       Dear Dr. Tucker:    Thank you for referring Krzysztfo Pryor to me for evaluation. Below are the relevant portions of my assessment and plan of care.            If you have questions, please do not hesitate to call me. I look forward to following Krzsyztof along with you.    Sincerely,      Azael Mae Jr., MD           CC  No Recipients

## 2018-01-16 ENCOUNTER — TELEPHONE (OUTPATIENT)
Dept: ORTHOPEDICS | Facility: CLINIC | Age: 63
End: 2018-01-16

## 2018-01-16 NOTE — TELEPHONE ENCOUNTER
Spoke with patient to provide telephone number to [facility] nerve test due to the fact that [facility] does not accept his insurance. Verbalized understanding.

## 2018-01-17 ENCOUNTER — TELEPHONE (OUTPATIENT)
Dept: NEUROLOGY | Facility: CLINIC | Age: 63
End: 2018-01-17

## 2018-01-17 NOTE — TELEPHONE ENCOUNTER
Current EMG appts at [facility] booked out through early March, patient seeking earlier appt. Pt has accepted an EMG appt with Dr. Reeves on 2/2/18 @10:00am. Appt letter to be mailed.

## 2018-01-17 NOTE — TELEPHONE ENCOUNTER
----- Message from Reba Gil sent at 1/16/2018  3:27 PM CST -----  Contact: pt [phone]  Pt would like to schedule a EMG.

## 2018-01-22 DIAGNOSIS — T14.8XXA NERVE INJURY: Primary | ICD-10-CM

## 2018-02-02 ENCOUNTER — PROCEDURE VISIT (OUTPATIENT)
Dept: NEUROLOGY | Facility: CLINIC | Age: 63
End: 2018-02-02
Payer: COMMERCIAL

## 2018-02-02 DIAGNOSIS — R29.898 WEAKNESS OF LEFT HAND: ICD-10-CM

## 2018-02-02 PROCEDURE — 95886 MUSC TEST DONE W/N TEST COMP: CPT | Mod: S$GLB,,, | Performed by: NEUROMUSCULOSKELETAL MEDICINE & OMM

## 2018-02-02 PROCEDURE — 95912 NRV CNDJ TEST 11-12 STUDIES: CPT | Mod: S$GLB,,, | Performed by: NEUROMUSCULOSKELETAL MEDICINE & OMM

## 2018-02-06 ENCOUNTER — TELEPHONE (OUTPATIENT)
Dept: ORTHOPEDICS | Facility: CLINIC | Age: 63
End: 2018-02-06

## 2018-02-06 NOTE — TELEPHONE ENCOUNTER
----- Message from Beth Perez MA sent at 2/6/2018 12:43 PM CST -----  Hello ladies, can you all schedule this pt for a EMG f/u.  Thanks Beth

## 2018-02-06 NOTE — TELEPHONE ENCOUNTER
----- Message from Mimi Moya sent at 2/6/2018  1:28 PM CST -----  Contact: [phone]  Patient called in returning your call. Please advise

## 2018-02-20 ENCOUNTER — TELEPHONE (OUTPATIENT)
Dept: NEUROLOGY | Facility: CLINIC | Age: 63
End: 2018-02-20

## 2018-02-20 ENCOUNTER — TELEPHONE (OUTPATIENT)
Dept: ORTHOPEDICS | Facility: CLINIC | Age: 63
End: 2018-02-20

## 2018-02-20 NOTE — TELEPHONE ENCOUNTER
Returned patient call to inform him that Dr. Reeves is out of the office until Monday. I will forward him the message that the patient is calling for his MRI results.

## 2018-02-20 NOTE — TELEPHONE ENCOUNTER
----- Message from Camille Quiroz sent at 2/20/2018  2:06 PM CST -----  Contact: pt  x_  1st Request  _  2nd Request  _  3rd Request    Who: LILIYA RIOS SR. [[id number]]    Why: Patient would like to speak with staff in reference to the status of getting an appt after his results were finalized. Please call to further discuss and advise. Pt states he is in a lot of pain.     What Number to Call Back:[phone]    When to Expect a call back: (Within 24 hours)    Please return the call at earliest convenience. Thanks!

## 2018-02-20 NOTE — TELEPHONE ENCOUNTER
Spoke with pt and informed multiple messages have been sent to the staff requesting EMG results. Understanding verbalized. Phone number provided to [facility] testing.     15:40 Pt was called back to inform that MD has been out sick. NICOLE.

## 2018-02-20 NOTE — TELEPHONE ENCOUNTER
----- Message from Uri Barajas sent at 2/20/2018  3:32 PM CST -----  Contact: Self @ [phone]  Pt is asking to speak w/ the doctor about results for EMG on 02/06/18. Pls call.

## 2018-02-21 ENCOUNTER — DOCUMENTATION ONLY (OUTPATIENT)
Dept: ORTHOPEDICS | Facility: CLINIC | Age: 63
End: 2018-02-21

## 2018-02-21 ENCOUNTER — TELEPHONE (OUTPATIENT)
Dept: ORTHOPEDICS | Facility: CLINIC | Age: 63
End: 2018-02-21

## 2018-02-21 NOTE — TELEPHONE ENCOUNTER
----- Message from Christiano Renner sent at 2/20/2018  4:38 PM CST -----  Contact: Pt  _ x 1st Request  _  2nd Request  _  3rd Request        Who: LILIYA RIOS SR. [[id number]]    Why: Returning a call back from your office. Please return the call at earliest convenience.   Thanks!    What Number to Call Back:[phone] (H)    When to Expect a call back: (With in 24 hours)

## 2018-02-21 NOTE — TELEPHONE ENCOUNTER
Spoke with pt, stated that he spoke with Dr. Reeves's office and informed him the MD would be back in office Friday. Understanding verbalized.

## 2018-02-27 ENCOUNTER — TELEPHONE (OUTPATIENT)
Dept: NEUROLOGY | Facility: CLINIC | Age: 63
End: 2018-02-27

## 2018-02-27 NOTE — TELEPHONE ENCOUNTER
Dr. Mae needs EMG results uploaded to patients profile today please. EMG was done on 02/02/2018 by Dr. Reeves but never uploaded to chart.

## 2018-03-01 ENCOUNTER — TELEPHONE (OUTPATIENT)
Dept: ORTHOPEDICS | Facility: CLINIC | Age: 63
End: 2018-03-01

## 2018-03-05 ENCOUNTER — OFFICE VISIT (OUTPATIENT)
Dept: ORTHOPEDICS | Facility: CLINIC | Age: 63
End: 2018-03-05
Payer: COMMERCIAL

## 2018-03-05 VITALS — HEIGHT: 69 IN | BODY MASS INDEX: 20.59 KG/M2 | WEIGHT: 139 LBS

## 2018-03-05 DIAGNOSIS — S64.12XD INJURY OF LEFT MEDIAN NERVE AT WRIST, SUBSEQUENT ENCOUNTER: Primary | ICD-10-CM

## 2018-03-05 PROCEDURE — 99214 OFFICE O/P EST MOD 30 MIN: CPT | Mod: S$GLB,,, | Performed by: ORTHOPAEDIC SURGERY

## 2018-03-05 PROCEDURE — 99999 PR PBB SHADOW E&M-EST. PATIENT-LVL III: CPT | Mod: PBBFAC,,, | Performed by: ORTHOPAEDIC SURGERY

## 2018-03-05 NOTE — PROGRESS NOTES
OFFICE VISIT AND PREOP H AND P    CHIEF COMPLAINT:  Left hand numbness and stiffness.    HISTORY OF PRESENT ILLNESS:  A 62-year-old male sustained a laceration to his   left wrist about nine months ago, treated surgically by Dr. Shelley in [place].    He has had ongoing symptoms including numbness left hand and stiffness with   limited use.  He completed therapy.  I think he sort of plateaued in therapy and   they discontinued him.  A recent nerve conduction study shows diffuse slowing   over the left wrist, but he really could not identify whether this was a   complete median nerve injury or partial.  I went over these findings today.  I   recommended surgical exploration, left wrist to include the nerves, tendons,   etc.  The risks and benefits of surgery explained to the patient, he   understands.    PAST MEDICAL HISTORY:  Significant for hypertension.    PAST SURGICAL HISTORY:  Includes left hand surgery.    FAMILY HISTORY:  Positive for cancer and diabetes.    SOCIAL HISTORY:  The patient smokes daily, half pack per day.  Drinks about 12   beers per week.    REVIEW OF SYSTEMS:  Negative fever, chills, rashes.    CURRENT MEDICATIONS:  Reviewed on chart.    ALLERGIES:  Iodine.    PHYSICAL EXAMINATION:  GENERAL:  Well-developed, well-nourished male in no acute distress, alert and   oriented x3.  HEENT:  Unremarkable.  LUNGS:  Clear to auscultation.  HEART:  Regular rate and rhythm.  ABDOMEN:  Soft, nontender.  EXTREMITIES:  Significant for the left hand, demonstrating healed volar incision   left wrist.  Slight swelling, tenderness, decreased sensation in median nerve   distribution, positive Tinel sign at the injury site.  Range of motion of the   index and middle finger limited.  There is some scarring down and limited   passive extension as well.    IMPRESSION:  1.  Scarring with probable neuroma, left median nerve.  2.  Contracture and tendon adhesions, left index and middle finger flexor   tendon.    PLAN:  The  patient would like to set up surgery for exploration left median   nerve, neurolysis and tenolysis.  Risks and benefits of surgery explained to the   patient, he understands.      IRVING/DORI  dd: 03/05/2018 11:29:05 (CST)  td: 03/06/2018 05:25:12 (CST)  Doc ID   #[id number]  Job ID #[id number]    CC:

## 2018-03-08 ENCOUNTER — CLINICAL SUPPORT (OUTPATIENT)
Dept: LAB | Facility: HOSPITAL | Age: 63
End: 2018-03-08
Attending: ORTHOPAEDIC SURGERY
Payer: COMMERCIAL

## 2018-03-08 ENCOUNTER — HOSPITAL ENCOUNTER (OUTPATIENT)
Dept: PREADMISSION TESTING | Facility: HOSPITAL | Age: 63
Discharge: HOME | End: 2018-03-08
Attending: ORTHOPAEDIC SURGERY
Payer: COMMERCIAL

## 2018-03-08 ENCOUNTER — ANESTHESIA EVENT (OUTPATIENT)
Dept: SURGERY | Facility: HOSPITAL | Age: 63
End: 2018-03-08
Payer: COMMERCIAL

## 2018-03-08 VITALS
WEIGHT: 150 LBS | OXYGEN SATURATION: 99 % | DIASTOLIC BLOOD PRESSURE: 86 MMHG | HEART RATE: 83 BPM | HEIGHT: 69 IN | SYSTOLIC BLOOD PRESSURE: 186 MMHG | BODY MASS INDEX: 22.22 KG/M2 | RESPIRATION RATE: 18 BRPM

## 2018-03-08 DIAGNOSIS — I10 ESSENTIAL HYPERTENSION: Primary | ICD-10-CM

## 2018-03-08 DIAGNOSIS — I10 ESSENTIAL HYPERTENSION: ICD-10-CM

## 2018-03-08 PROCEDURE — 93010 EKG 12-LEAD: ICD-10-PCS | Mod: S$GLB,,, | Performed by: INTERNAL MEDICINE

## 2018-03-08 PROCEDURE — 93005 ELECTROCARDIOGRAM TRACING: CPT

## 2018-03-08 PROCEDURE — 93010 ELECTROCARDIOGRAM REPORT: CPT | Mod: S$GLB,,, | Performed by: INTERNAL MEDICINE

## 2018-03-08 RX ORDER — LIDOCAINE HYDROCHLORIDE 10 MG/ML
1 INJECTION, SOLUTION EPIDURAL; INFILTRATION; INTRACAUDAL; PERINEURAL ONCE
Status: CANCELLED | OUTPATIENT
Start: 2018-03-08 | End: 2018-03-08

## 2018-03-08 RX ORDER — SODIUM CHLORIDE, SODIUM LACTATE, POTASSIUM CHLORIDE, CALCIUM CHLORIDE 600; 310; 30; 20 MG/100ML; MG/100ML; MG/100ML; MG/100ML
INJECTION, SOLUTION INTRAVENOUS CONTINUOUS
Status: CANCELLED | OUTPATIENT
Start: 2018-03-08

## 2018-03-08 NOTE — PRE-PROCEDURE INSTRUCTIONS
Wife - Shahnaz - [phone]    Allergies, medical, surgical, family and psychosocial histories reviewed with patient. Periop plan of care reviewed. Preop instructions given, including medications to take and to hold. Time allotted for questions to be addressed.  Patient verbalized understanding.

## 2018-03-08 NOTE — ANESTHESIA PREPROCEDURE EVALUATION
03/08/2018  Krzysztof Pryor Sr. is a 63 y.o., male is scheduled for left median nerve repair and tenolysis of fingers under reg/MAC/gen on 3/09/2018.    History reviewed. No pertinent surgical history.      Anesthesia Evaluation    I have reviewed the Patient Summary Reports.    I have reviewed the Nursing Notes.   I have reviewed the Medications.     Review of Systems  Anesthesia Hx:  No problems with previous Anesthesia  History of prior surgery of interest to airway management or planning: Previous anesthesia: MAC  colonoscopy with MAC.   Denies Personal Hx of Anesthesia complications.   Social:  Smoker, Alcohol Use    Hematology/Oncology:  Hematology Normal        EENT/Dental:EENT/Dental Normal   Cardiovascular:   Exercise tolerance: good Hypertension, well controlled Denies Dysrhythmias.   Denies Angina. hyperlipidemia        Pulmonary:   Denies Shortness of breath.    Renal/:  Renal/ Normal     Hepatic/GI:  Hepatic/GI Normal    Musculoskeletal:   States accident while cutting tree in 6/2017; states left wrist swollen and cannot move index finger well   Neurological:   Seizures  Seizure Disorder, Absence ([place]) , Most recent seizure occurred 6 month to 1 year ago Reported recent (some time in 2017 after hand injury 6/2017)  hx of seizure like activity and states saw neuro at [facility]; states was briefly placed on medication, but was taken off; pt not clear on details, however states has not had any recent events   Endocrine:  Endocrine Normal        Physical Exam  General:  Well nourished    Airway/Jaw/Neck:  Airway Findings: Mouth Opening: Normal Tongue: Normal  General Airway Assessment: Adult  Mallampati: II  TM Distance: Normal, at least 6 cm        Eyes/Ears/Nose:  EYES/EARS/NOSE FINDINGS: Normal   Dental:  Dental Findings: Upper Dentures, Lower Dentures   Chest/Lungs:  Chest/Lungs Clear     Heart/Vascular:  Heart Findings: Normal Heart murmur: negative    Abdomen:  Abdomen Findings: Normal    Musculoskeletal:  Musculoskeletal Findings: (left wrist and contracture of 2nd and 3rd fingers) Tender Joint, Joint Contracture, Swollen Joint     Mental Status:  Mental Status Findings: Normal      2D Echo w/CFD 5/02/2018  CONCLUSIONS     1 - Normal left ventricular systolic function (EF 55-60%).     2 - No wall motion abnormalities.     3 - Normal left ventricular diastolic function.     4 - Normal right ventricular systolic function .     5 - The estimated PA systolic pressure is 19 mmHg.   This document has been electronically    SIGNED BY: Micheal Fernandez MD On: 05/02/2017 13:37    Anesthesia Plan  Type of Anesthesia, risks & benefits discussed:  Anesthesia Type:  general, MAC, regional  Patient's Preference:   Intra-op Monitoring Plan: standard ASA monitors  Intra-op Monitoring Plan Comments:   Post Op Pain Control Plan: multimodal analgesia  Post Op Pain Control Plan Comments:   Induction:   IV  Beta Blocker:  Patient is not currently on a Beta-Blocker (No further documentation required).       Informed Consent: Patient understands risks and agrees with Anesthesia plan.  Questions answered. Anesthesia consent signed with patient.  ASA Score: 2     Day of Surgery Review of History & Physical:            Ready For Surgery From Anesthesia Perspective.

## 2018-03-08 NOTE — DISCHARGE INSTRUCTIONS
Your surgery is scheduled for 3/9/18.    Please report to Outpatient Surgery Intake Office on the 2nd FLOOR at 10:45a.m.          INSTRUCTIONS IMPORTANT!!!  ¨ Do not eat or drink after 12 midnight-including water. OK to brush teeth, no   gum, candy or mints!    ¨ Take only these medicines with a small swallow of water-morning of surgery; amlodipine and triamterene        ____  Proceed to [facility] on 3/8/18 for additional blood test.          ____  No powder, lotions or creams to surgical area.  ____  Please remove all jewelry, including piercings and leave at home.  ____  No money or valuables needed. Please leave at home.  ____  Please bring any documents given by your doctor.  ____  If going home the same day, arrange for a ride home. You will not be able to             drive if Anesthesia was used.  ____  Wear loose fitting clothing. Allow for dressings, bandages.  ____  Stop Aspirin, Ibuprofen, Motrin and Aleve at least 3-5 days before surgery, unless otherwise instructed by your doctor, or the nurse.   You MAY use Tylenol/acetaminophen until day of surgery.  ____  Wash the surgical area with Hibiclens the night before surgery, and again the             morning of surgery.  Be sure to rinse hibiclens off completely (if instructed by   nurse).  ____  If you take diabetic medication, do not take am of surgery unless instructed by Doctor.  ____  Call MD for temperature above 101 degrees.  ____ Stop taking any Fish Oil supplement or any Vitamins that contain Vitamin E at least 5 days prior to surgery.  ____ Do Not wear your contact lenses the day of your procedure.  You may wear your glasses.        I have read or had read and explained to me, and understand the above information.  Additional comments or instructions:  For additional questions call [phone]      Pre-Op Bathing Instructions    Before surgery, you can play an important role in your own health.    Because skin is not sterile, we need  to be sure that your skin is as free of germs as possible. By following the instructions below, you can reduce the number of germs on your skin before surgery.    IMPORTANT: You will need to shower with a special soap called Hibiclens*, available at any pharmacy.  If you are allergic to Chlorhexidine (the antiseptic in Hibiclens), use an antibacterial soap such as Dial Soap for your preoperative shower.  You will shower with Hibiclens both the night before your surgery and the morning of your surgery.  Do not use Hibiclens on the head, face or genitals to avoid injury to those areas.    STEP #1: THE NIGHT BEFORE YOUR SURGERY     1. Do not shave the area of your body where your surgery will be performed.  2. Shower and wash your hair and body as usual with your normal soap and shampoo.  3. Rinse your hair and body thoroughly after you shower to remove all soap residue.  4. With your hand, apply one packet of Hibiclens soap to the surgical site.   5. Wash the site gently for five (5) minutes. Do not scrub your skin too hard.   6. Do not wash with your regular soap after Hibiclens is used.  7. Rinse your body thoroughly.  8. Pat yourself dry with a clean, soft towel.  9. Do not use lotion, cream, or powder.  10. Wear clean clothes.    STEP #2: THE MORNING OF YOUR SURGERY     1. Repeat Step #1.    * Not to be used by people allergic to Chlorhexidine.

## 2018-03-09 ENCOUNTER — HOSPITAL ENCOUNTER (OUTPATIENT)
Facility: HOSPITAL | Age: 63
Discharge: HOME | End: 2018-03-09
Attending: ORTHOPAEDIC SURGERY | Admitting: ORTHOPAEDIC SURGERY
Payer: COMMERCIAL

## 2018-03-09 ENCOUNTER — ANESTHESIA (OUTPATIENT)
Dept: SURGERY | Facility: HOSPITAL | Age: 63
End: 2018-03-09
Payer: COMMERCIAL

## 2018-03-09 ENCOUNTER — SURGERY (OUTPATIENT)
Age: 63
End: 2018-03-09

## 2018-03-09 VITALS
HEART RATE: 75 BPM | RESPIRATION RATE: 18 BRPM | OXYGEN SATURATION: 97 % | HEIGHT: 69 IN | SYSTOLIC BLOOD PRESSURE: 135 MMHG | BODY MASS INDEX: 22.22 KG/M2 | DIASTOLIC BLOOD PRESSURE: 76 MMHG | WEIGHT: 150 LBS | TEMPERATURE: 98 F

## 2018-03-09 DIAGNOSIS — S64.12XD INJURY OF LEFT MEDIAN NERVE AT WRIST, SUBSEQUENT ENCOUNTER: ICD-10-CM

## 2018-03-09 PROCEDURE — 26145 TENDON EXCISION PALM/FINGER: CPT | Mod: 51,F2,, | Performed by: ORTHOPAEDIC SURGERY

## 2018-03-09 PROCEDURE — 25000003 PHARM REV CODE 250: Performed by: NURSE PRACTITIONER

## 2018-03-09 PROCEDURE — 25000003 PHARM REV CODE 250: Performed by: ORTHOPAEDIC SURGERY

## 2018-03-09 PROCEDURE — 88304 TISSUE EXAM BY PATHOLOGIST: CPT | Performed by: PATHOLOGY

## 2018-03-09 PROCEDURE — 63600175 PHARM REV CODE 636 W HCPCS: Performed by: ORTHOPAEDIC SURGERY

## 2018-03-09 PROCEDURE — 37000009 HC ANESTHESIA EA ADD 15 MINS: Performed by: ORTHOPAEDIC SURGERY

## 2018-03-09 PROCEDURE — 26440 RELEASE PALM/FINGER TENDON: CPT | Mod: 59,F1,, | Performed by: ORTHOPAEDIC SURGERY

## 2018-03-09 PROCEDURE — 63600175 PHARM REV CODE 636 W HCPCS: Performed by: NURSE ANESTHETIST, CERTIFIED REGISTERED

## 2018-03-09 PROCEDURE — 88304 TISSUE EXAM BY PATHOLOGIST: CPT | Mod: 26,,, | Performed by: PATHOLOGY

## 2018-03-09 PROCEDURE — 36000707: Performed by: ORTHOPAEDIC SURGERY

## 2018-03-09 PROCEDURE — 64910 NERVE REPAIR W/ALLOGRAFT: CPT | Mod: LT,,, | Performed by: ORTHOPAEDIC SURGERY

## 2018-03-09 PROCEDURE — 71000015 HC POSTOP RECOV 1ST HR: Performed by: ORTHOPAEDIC SURGERY

## 2018-03-09 PROCEDURE — 27800903 OPTIME MED/SURG SUP & DEVICES OTHER IMPLANTS: Performed by: ORTHOPAEDIC SURGERY

## 2018-03-09 PROCEDURE — 63600175 PHARM REV CODE 636 W HCPCS: Performed by: ANESTHESIOLOGY

## 2018-03-09 PROCEDURE — 36000706: Performed by: ORTHOPAEDIC SURGERY

## 2018-03-09 PROCEDURE — 37000008 HC ANESTHESIA 1ST 15 MINUTES: Performed by: ORTHOPAEDIC SURGERY

## 2018-03-09 PROCEDURE — 64486 TAP BLOCK UNIL BY INJECTION: CPT | Performed by: ANESTHESIOLOGY

## 2018-03-09 PROCEDURE — 64784 REMOVE NERVE LESION: CPT | Mod: 51,LT,, | Performed by: ORTHOPAEDIC SURGERY

## 2018-03-09 DEVICE — IMPLANTABLE DEVICE: Type: IMPLANTABLE DEVICE | Site: FINGER | Status: FUNCTIONAL

## 2018-03-09 DEVICE — PROTECTOR AXOGUARD NERVE 10X40: Type: IMPLANTABLE DEVICE | Site: FINGER | Status: FUNCTIONAL

## 2018-03-09 RX ORDER — PROPOFOL 10 MG/ML
VIAL (ML) INTRAVENOUS CONTINUOUS PRN
Status: DISCONTINUED | OUTPATIENT
Start: 2018-03-09 | End: 2018-03-09

## 2018-03-09 RX ORDER — OXYCODONE AND ACETAMINOPHEN 10; 325 MG/1; MG/1
1 TABLET ORAL EVERY 4 HOURS PRN
Qty: 40 TABLET | Refills: 0 | Status: SHIPPED | OUTPATIENT
Start: 2018-03-09 | End: 2018-08-14

## 2018-03-09 RX ORDER — CEFAZOLIN SODIUM 2 G/50ML
2 SOLUTION INTRAVENOUS
Status: DISCONTINUED | OUTPATIENT
Start: 2018-03-09 | End: 2018-03-09 | Stop reason: HOSPADM

## 2018-03-09 RX ORDER — MIDAZOLAM HYDROCHLORIDE 1 MG/ML
INJECTION, SOLUTION INTRAMUSCULAR; INTRAVENOUS
Status: DISCONTINUED | OUTPATIENT
Start: 2018-03-09 | End: 2018-03-09

## 2018-03-09 RX ORDER — KETOROLAC TROMETHAMINE 30 MG/ML
30 INJECTION, SOLUTION INTRAMUSCULAR; INTRAVENOUS ONCE
Status: CANCELLED | OUTPATIENT
Start: 2018-03-09 | End: 2018-03-12

## 2018-03-09 RX ORDER — LIDOCAINE HCL/PF 100 MG/5ML
SYRINGE (ML) INTRAVENOUS
Status: DISCONTINUED | OUTPATIENT
Start: 2018-03-09 | End: 2018-03-09

## 2018-03-09 RX ORDER — BACITRACIN 50000 [IU]/1
INJECTION, POWDER, FOR SOLUTION INTRAMUSCULAR
Status: DISCONTINUED | OUTPATIENT
Start: 2018-03-09 | End: 2018-03-09 | Stop reason: HOSPADM

## 2018-03-09 RX ORDER — ONDANSETRON 8 MG/1
8 TABLET, ORALLY DISINTEGRATING ORAL EVERY 8 HOURS PRN
Status: CANCELLED | OUTPATIENT
Start: 2018-03-09

## 2018-03-09 RX ORDER — PROPOFOL 10 MG/ML
VIAL (ML) INTRAVENOUS
Status: DISCONTINUED | OUTPATIENT
Start: 2018-03-09 | End: 2018-03-09

## 2018-03-09 RX ORDER — SODIUM CHLORIDE, SODIUM LACTATE, POTASSIUM CHLORIDE, CALCIUM CHLORIDE 600; 310; 30; 20 MG/100ML; MG/100ML; MG/100ML; MG/100ML
INJECTION, SOLUTION INTRAVENOUS CONTINUOUS
Status: DISCONTINUED | OUTPATIENT
Start: 2018-03-09 | End: 2018-03-09 | Stop reason: HOSPADM

## 2018-03-09 RX ORDER — ACETAMINOPHEN 325 MG/1
650 TABLET ORAL EVERY 4 HOURS PRN
Status: CANCELLED | OUTPATIENT
Start: 2018-03-09

## 2018-03-09 RX ORDER — LIDOCAINE HYDROCHLORIDE 10 MG/ML
1 INJECTION, SOLUTION EPIDURAL; INFILTRATION; INTRACAUDAL; PERINEURAL ONCE
Status: DISCONTINUED | OUTPATIENT
Start: 2018-03-09 | End: 2018-03-09 | Stop reason: HOSPADM

## 2018-03-09 RX ORDER — OXYCODONE HYDROCHLORIDE 5 MG/1
10 TABLET ORAL EVERY 4 HOURS PRN
Status: CANCELLED | OUTPATIENT
Start: 2018-03-09

## 2018-03-09 RX ADMIN — MIDAZOLAM 5 MG: 1 INJECTION INTRAMUSCULAR; INTRAVENOUS at 01:03

## 2018-03-09 RX ADMIN — PROPOFOL 150 MCG/KG/MIN: 10 INJECTION, EMULSION INTRAVENOUS at 02:03

## 2018-03-09 RX ADMIN — LIDOCAINE HYDROCHLORIDE 50 MG: 20 INJECTION, SOLUTION INTRAVENOUS at 02:03

## 2018-03-09 RX ADMIN — PROPOFOL 50 MG: 10 INJECTION, EMULSION INTRAVENOUS at 02:03

## 2018-03-09 RX ADMIN — SODIUM CHLORIDE, POTASSIUM CHLORIDE, SODIUM LACTATE AND CALCIUM CHLORIDE 10 ML/HR: 600; 310; 30; 20 INJECTION, SOLUTION INTRAVENOUS at 10:03

## 2018-03-09 RX ADMIN — CEFAZOLIN SODIUM 2 G: 2 SOLUTION INTRAVENOUS at 02:03

## 2018-03-09 RX ADMIN — BACITRACIN 50000 UNITS: 5000 INJECTION, POWDER, FOR SOLUTION INTRAMUSCULAR at 02:03

## 2018-03-09 NOTE — H&P (VIEW-ONLY)
OFFICE VISIT AND PREOP H AND P    CHIEF COMPLAINT:  Left hand numbness and stiffness.    HISTORY OF PRESENT ILLNESS:  A 62-year-old male sustained a laceration to his   left wrist about nine months ago, treated surgically by Dr. Shelley in [place].    He has had ongoing symptoms including numbness left hand and stiffness with   limited use.  He completed therapy.  I think he sort of plateaued in therapy and   they discontinued him.  A recent nerve conduction study shows diffuse slowing   over the left wrist, but he really could not identify whether this was a   complete median nerve injury or partial.  I went over these findings today.  I   recommended surgical exploration, left wrist to include the nerves, tendons,   etc.  The risks and benefits of surgery explained to the patient, he   understands.    PAST MEDICAL HISTORY:  Significant for hypertension.    PAST SURGICAL HISTORY:  Includes left hand surgery.    FAMILY HISTORY:  Positive for cancer and diabetes.    SOCIAL HISTORY:  The patient smokes daily, half pack per day.  Drinks about 12   beers per week.    REVIEW OF SYSTEMS:  Negative fever, chills, rashes.    CURRENT MEDICATIONS:  Reviewed on chart.    ALLERGIES:  Iodine.    PHYSICAL EXAMINATION:  GENERAL:  Well-developed, well-nourished male in no acute distress, alert and   oriented x3.  HEENT:  Unremarkable.  LUNGS:  Clear to auscultation.  HEART:  Regular rate and rhythm.  ABDOMEN:  Soft, nontender.  EXTREMITIES:  Significant for the left hand, demonstrating healed volar incision   left wrist.  Slight swelling, tenderness, decreased sensation in median nerve   distribution, positive Tinel sign at the injury site.  Range of motion of the   index and middle finger limited.  There is some scarring down and limited   passive extension as well.    IMPRESSION:  1.  Scarring with probable neuroma, left median nerve.  2.  Contracture and tendon adhesions, left index and middle finger flexor   tendon.    PLAN:  The  patient would like to set up surgery for exploration left median   nerve, neurolysis and tenolysis.  Risks and benefits of surgery explained to the   patient, he understands.      IRVING/DORI  dd: 03/05/2018 11:29:05 (CST)  td: 03/06/2018 05:25:12 (CST)  Doc ID   #[id number]  Job ID #[id number]    CC:

## 2018-03-09 NOTE — BRIEF OP NOTE
Certification of Assistant at Surgery       Surgery Date: 3/9/2018     Participating Surgeons:  Surgeon(s) and Role:     * Azael Mae Jr., MD - Primary    Procedures:  Procedure(s) (LRB):  REPAIR-NERVE-HAND (Left)  TENOLYSIS-FINGER (Left)    Assistant Surgeon's Certification of Necessity:  I understand that section 1842 (b) (6) (d) of the Social Security Act generally prohibits Medicare Part B reasonable charge payment for the services of assistants at surgery in teaching hospitals when qualified residents are available to furnish such services. I certify that the services for which payment is claimed were medically necessary, and that no qualified resident was available to perform the services. I further understand that these services are subject to post-payment review by the Medicare carrier.      Nicole Lubin PA-C    03/09/2018  4:25 PM

## 2018-03-09 NOTE — BRIEF OP NOTE
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  Qty: 40 tablet, Refills: 0         CONTINUE these medications which have NOT CHANGED    Details   amlodipine (NORVASC) 10 MG tablet Take 1 tablet (10 mg total) by mouth once daily.  Qty: 30 tablet, Refills: 11      atorvastatin (LIPITOR) 40 MG tablet Take 1 tablet (40 mg total) by mouth once daily.  Qty: 30 tablet, Refills: 11       oxycodone-acetaminophen (PERCOCET) 5-325 mg per tablet       triamterene-hydrochlorothiazide 37.5-25 mg (MAXZIDE-25) 37.5-25 mg per tablet Take 1 tablet by mouth once daily.  Qty: 30 tablet, Refills: 5             Discharge Procedure Orders  Diet general     Shower on day dressing removed (No bath)     Call MD for:  temperature >100.4     Call MD for:  persistent nausea and vomiting     Call MD for:  severe uncontrolled pain     Leave dressing on - Keep it clean, dry, and intact until clinic visit     Keep surgical extremity elevated       Follow-up Information     Azael Mae Jr, MD. Schedule an appointment as soon as possible for a visit in 11 days.    Specialties:  Hand Surgery, Orthopedic Surgery  Why:  For suture removal  Contact information:  [address]  [address] [place] [id number] [phone]

## 2018-03-09 NOTE — TRANSFER OF CARE
"Anesthesia Transfer of Care Note    Patient: Krzysztof Pryor Sr.    Procedure(s) Performed: Procedure(s) (LRB):  REPAIR-NERVE-HAND (Left)  TENOLYSIS-FINGER (Left)    Patient location: OPS    Anesthesia Type: MAC    Transport from OR: Transported from OR on room air with adequate spontaneous ventilation    Post pain: adequate analgesia    Post assessment: no apparent anesthetic complications    Post vital signs: stable    Level of consciousness: awake, alert and oriented    Nausea/Vomiting: no nausea/vomiting    Complications: none    Transfer of care protocol was followed      Last vitals:   Visit Vitals  BP (!) 145/78   Pulse 74   Temp 36.3 °C (97.3 °F) (Skin)   Resp 18   Ht 5' 9" (1.753 m)   Wt 68 kg (150 lb)   SpO2 97%   BMI 22.15 kg/m²     "

## 2018-03-09 NOTE — ANESTHESIA POSTPROCEDURE EVALUATION
"Anesthesia Post Evaluation    Patient: Krzysztof Pryor Sr.    Procedure(s) Performed: Procedure(s) (LRB):  REPAIR-NERVE-HAND (Left)  TENOLYSIS-FINGER (Left)    Final Anesthesia Type: MAC  Patient location during evaluation: GI PACU  Patient participation: Yes- Able to Participate  Level of consciousness: awake and alert and oriented  Post-procedure vital signs: reviewed and stable  Pain management: adequate  Airway patency: patent  PONV status at discharge: No PONV  Anesthetic complications: no      Cardiovascular status: blood pressure returned to baseline and hemodynamically stable  Respiratory status: unassisted, spontaneous ventilation and room air  Hydration status: euvolemic  Follow-up not needed.        Visit Vitals  BP (!) 145/78   Pulse 74   Temp 36.3 °C (97.3 °F) (Skin)   Resp 18   Ht 5' 9" (1.753 m)   Wt 68 kg (150 lb)   SpO2 97%   BMI 22.15 kg/m²       Pain/Brian Score: Pain Assessment Performed: Yes (3/9/2018 10:35 AM)  Presence of Pain: complains of pain/discomfort (3/9/2018 10:35 AM)      "

## 2018-03-09 NOTE — ANESTHESIA PROCEDURE NOTES
Peripheral    Patient location during procedure: post-op   Block not for primary anesthetic.  Reason for block: at surgeon's request and post-op pain management   Post-op Pain Location: left hand  Start time: 3/9/2018 1:00 PM  Timeout: 3/9/2018 12:59 PM   End time: 3/9/2018 1:08 PM  Surgery related to: left hand  Staffing  Anesthesiologist: SERVANDO COSTELLO  Performed: anesthesiologist   Preanesthetic Checklist  Completed: patient identified, site marked, surgical consent, pre-op evaluation, timeout performed, IV checked, risks and benefits discussed and monitors and equipment checked  Peripheral Block  Patient position: supine  Prep: ChloraPrep  Patient monitoring: cardiac monitor, heart rate, continuous pulse ox, continuous capnometry and frequent blood pressure checks  Block type: TAP  Laterality: left  Injection technique: single shot  Needle  Needle type: Stimuplex   Needle gauge: 21 G  Needle length: 4 in  Needle localization: ultrasound guidance   -ultrasound image captured on disc.  Assessment  Injection assessment: negative aspiration, negative parasthesia and local visualized surrounding nerve  Paresthesia pain: none  Heart rate change: no  Slow fractionated injection: yes  Medications:  Bolus administered: 20 mL of 0.5 ropivacaine  Epinephrine added: 3.75 mcg/mL (1/300,000)  Additional Notes  VSS.  [facility] RN monitoring vitals throughout procedure.  Patient tolerated procedure well. Pre-existing median nerve palsy/paresthesia/pain for which patient is undergoing procedure for. Dexamethasone 4 mg added

## 2018-03-09 NOTE — DISCHARGE INSTRUCTIONS
After Hand Surgery  After surgery, the better you take care of yourself--especially your hand--the sooner it will heal. Follow your surgeons instructions. Try not to bump your hand, and dont move or lift anything while youre still wearing bandages, a splint, or a cast.    Care for your hand    · Keep your hand elevated above heart level as much as possible for the first several days after surgery. This helps reduce swelling and pain.  · To help prevent infection and speed healing, take care not to get your cast or bandages wet.  ·   Relieve pain as directed  Your surgeon may prescribe pain medicine or suggest you take an anti-inflammatory medicine. You might also be instructed to apply ice (or another cold source) to your hand. If you use ice cubes, put them in a plastic bag and rest it on top of your bandages. Leave the cold source on your hand for as long as its comfortable. Do this several times a day for the first few days after surgery. It may take several minutes before you can feel the cold through the cast or bandages.    Follow up with your surgeon  During a follow-up visit after surgery, your surgeon will check your progress. The stitches, bandages, splint, or cast may be removed. A new cast or splint may be placed. If your hand has healed enough, your surgeon may prescribe exercises.    Do prescribed hand exercises  Your surgeon may recommend that you do exercises. These may be done under the guidance of a physical or occupational therapist. The exercises strengthen your hand, help you regain flexibility, and restore proper function. Do the exercises as advised.    Call your surgeon if you have...  · A fever higher than 100.4°F (38.0°C) taken by mouth  · Side effects from your medicine, such as prolonged nausea  · A wet or loose dressing, or a dressing that is too tight  · Excessive bleeding  · Increased, ongoing pain or numbness  · Signs of infection (such as drainage, warmth, or redness) at the  incision site  ·      Discharge Instructions: After Your Surgery  Youve just had surgery. During surgery, you were given medicine called anesthesia to keep you relaxed and free of pain. After surgery, you may have some pain or nausea. This is common. Here are some tips for feeling better and getting well after surgery.     Stay on schedule with your medicine.     Going home  Your healthcare provider will show you how to take care of yourself when you go home. He or she will also answer your questions. Have an adult family member or friend drive you home. For the first 24 hours after your surgery:  · Do not drive or use heavy equipment.  · Do not make important decisions or sign legal papers.  · Do not drink alcohol.  · Have someone stay with you, if needed. He or she can watch for problems and help keep you safe.  Be sure to go to all follow-up visits with your healthcare provider. And rest after your surgery for as long as your healthcare provider tells you to.    Coping with pain  If you have pain after surgery, pain medicine will help you feel better. Take it as told, before pain becomes severe. Also, ask your healthcare provider or pharmacist about other ways to control pain. This might be with heat, ice, or relaxation. And follow any other instructions your surgeon or nurse gives you.    Tips for taking pain medicine  To get the best relief possible, remember these points:  · Pain medicines can upset your stomach. Taking them with a little food may help.  · Most pain relievers taken by mouth need at least 20 to 30 minutes to start to work.  · Taking medicine on a schedule can help you remember to take it. Try to time your medicine so that you can take it before starting an activity. This might be before you get dressed, go for a walk, or sit down for dinner.  · Constipation is a common side effect of pain medicines.  Drinking lots of fluids and eating foods such as fruits and vegetables that are high in fiber  can also help.   · Drinking alcohol and taking pain medicine can cause dizziness and slow your breathing. It can even be deadly. Do not drink alcohol while taking pain medicine.  · Pain medicine can make you react more slowly to things. Do not drive or run machinery while taking pain medicine.  Your healthcare provider may tell you to take acetaminophen to help ease your pain. Ask him or her how much you are supposed to take each day. Acetaminophen or other pain relievers may interact with your prescription medicines or other over-the-counter (OTC) medicines. Some prescription medicines have acetaminophen and other ingredients. Using both prescription and OTC acetaminophen for pain can cause you to overdose. Read the labels on your OTC medicines with care. This will help you to clearly know the list of ingredients, how much to take, and any warnings. It may also help you not take too much acetaminophen. If you have questions or do not understand the information, ask your pharmacist or healthcare provider to explain it to you before you take the OTC medicine.  Managing nausea  Some people have an upset stomach after surgery. This is often because of anesthesia, pain, or pain medicine, or the stress of surgery. These tips will help you handle nausea and eat healthy foods as you get better. If you were on a special food plan before surgery, ask your healthcare provider if you should follow it while you get better. These tips may help:  · Do not push yourself to eat. Your body will tell you when to eat and how much.  · Start off with clear liquids and soup. They are easier to digest.  · Next try semi-solid foods, such as mashed potatoes, applesauce, and gelatin, as you feel ready.  · Slowly move to solid foods. Dont eat fatty, rich, or spicy foods at first.  · Do not force yourself to have 3 large meals a day. Instead eat smaller amounts more often.  · Take pain medicines with a small amount of solid food, such as  crackers or toast, to avoid nausea.     Call your surgeon if  · You still have pain an hour after taking medicine. The medicine may not be strong enough.  · You feel too sleepy, dizzy, or groggy. The medicine may be too strong.  · You have side effects like nausea, vomiting, or skin changes, such as rash, itching, or hives.         Rest, wear the sling for comfort. Keep the dressing in place, do not get wet or remove.  Notify Dr. Mae for any problems or concerns.

## 2018-03-09 NOTE — PLAN OF CARE
Criteria met for discharge, IV removed, instructions explained,copy given. Denies pain, tolerating po well, voiding without difficulty. Pt and family all verbalize understanding of instructions, have no further questions.Discharged home with family in good condition.

## 2018-03-09 NOTE — INTERVAL H&P NOTE
The patient has been examined and the H&P has been reviewed:    I concur with the findings and no changes have occurred since H&P was written.    Anesthesia/Surgery risks, benefits and alternative options discussed and understood by patient/family.          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

## 2018-03-10 NOTE — OP NOTE
DATE OF PROCEDURE:  03/09/2018    PREOPERATIVE DIAGNOSES:  1.  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  2.  Scarring adhesions flexor tendons, left thumb, index, and middle finger.    POSTOPERATIVE DIAGNOSES:    1.  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  2.  Scarring adhesions flexor tendons, left thumb, index, and middle finger.    OPERATIVE PROCEDURES:  1.  Excision of neuroma-in-continuity left median nerve with neurolysis and   allograft nerve repair using AxoGen allograft (30 mm x 5 mm in length).  2.  AxoGen nerve wrap to protect nerve repair 40 mm length.  3.  Flexor tenosynovitis, left thumb flexor pollicis longus.  4.  Flexor tenolysis FDP and FDS left index finger.  5.  Flexor tenosynovitis with excision of adhesions, left middle finger FDS and   FDP.    SURGEON:  Azael Mae Jr., M.D.    FIRST ASSISTANT:  BRODIE Wilkes.    ANESTHESIA:  Regional block.    ESTIMATED BLOOD LOSS:  Minimal.    COMPLICATIONS:  None.    SPECIMENS:  Neuroma.    BRIEF INDICATIONS:  A 63-year-old male sustained injury to his left forearm last   year with subsequent repair of the median nerve and multiple tendons has had   very little return if any in terms of median nerve function and significant   limitation of movement and pain.  Taken to surgery for the above procedure.    OPERATIVE PROCEDURE IN DETAIL:  After operative consent was obtained, the   patient brought to the Operating Room and placed supine on the operating room   table.  Anesthesia by regional block performed by the Anesthesia staff.  After   the left arm was anesthetized, tourniquet applied to the arm, left upper   extremity prepped and draped out in the normal sterile fashion.  Esmarch used to   exsanguinate the limb.  Tourniquet inflated to 225 mmHg.    Following this, a volar incision made through the previous surgical scar,   beginning in the left forearm and extending up to the wrist crease with a #15   blade.  Full thickness skin flaps  were carefully elevated medially and   laterally.  A large amount of scar tissue was immediately encountered and   meticulous dissection was used to remove all scar tissue, freeing up the median   nerve and all the flexor tendons.  The median nerve had healed with a huge   neuroma-in-continuity, multiple adhesions to the flexor tendon surrounding, and   soft tissues.  All this was removed with the neurolysis, the neuroma left   in-continuity to later.  All the flexor tendons were then evaluated, they were   all intact and careful tenosynovectomy, removal of adhesions, and tenolysis was   performed of the FPL, FDS, and FDP to the index and middle finger.  The ring and   small really did not require much treatment at all.  After freeing up all the   tendon, there was significant contracture of the MP joint of the left thumb and   the PIP of the left index and middle finger, which was intraarticular, this was   not addressed today.  Attention then turned to the median nerve.  Approximately   30 mm of bad nerve was present and this was excised on each end with the scalpel   blade and a tongue depressor and a 30 mm allograft AxoGen nerve was then placed   as a bridging nerve graft and sewn on each end with combination of 8-0 and 6-0   nylon suture, epineurial sutures.  Good repair was achieved.  Following this,   the AxoGen nerve wrap was used to wrap around both ends of the repair and the   entire length of the nerve graft.  This was wrapped carefully in a   circumferential fashion with multiple sutures of 6-0 Prolene.  Good repair was   achieved.  Good wrap was achieved without excessive tightness or excessive   tension.  The wound thoroughly irrigated with antibiotic saline solution.  The   skin closed with a running and interrupted 5-0 nylon horizontal mattress suture.    A bulky hand dressing applied followed by a volar splint in neutral position,   tourniquet deflated.  The patient was brought to the Recovery Room  in stable   condition.  All sponge and needle counts reported as correct.  No complications.      IRVING/DORI  dd: 03/09/2018 16:37:25 (CST)  td: 03/09/2018 19:55:06 (CST)  Doc ID   #[id number]  Job ID #[id number]    CC:

## 2018-03-19 ENCOUNTER — OFFICE VISIT (OUTPATIENT)
Dept: ORTHOPEDICS | Facility: CLINIC | Age: 63
End: 2018-03-19
Payer: COMMERCIAL

## 2018-03-19 DIAGNOSIS — S64.12XD INJURY OF LEFT MEDIAN NERVE AT WRIST, SUBSEQUENT ENCOUNTER: Primary | ICD-10-CM

## 2018-03-19 PROCEDURE — 99024 POSTOP FOLLOW-UP VISIT: CPT | Mod: S$GLB,,, | Performed by: ORTHOPAEDIC SURGERY

## 2018-03-19 PROCEDURE — 99999 PR PBB SHADOW E&M-EST. PATIENT-LVL III: CPT | Mod: PBBFAC,,, | Performed by: ORTHOPAEDIC SURGERY

## 2018-03-19 RX ORDER — OXYCODONE AND ACETAMINOPHEN 5; 325 MG/1; MG/1
1 TABLET ORAL EVERY 12 HOURS PRN
Qty: 30 TABLET | Refills: 0 | Status: SHIPPED | OUTPATIENT
Start: 2018-03-19 | End: 2018-08-14

## 2018-03-19 NOTE — PROGRESS NOTES
HISTORY OF PRESENT ILLNESS:  Mr. Pryor is one week out from excision of   neuroma and allograft nerve graft to left median nerve.  He also had tenolysis   of multiple flexor tendons to the hand and wrist.    He is doing well.  Pain is better since surgery.    PHYSICAL EXAMINATION:  LEFT HAND AND WRIST:  The incision is healing well.  Sutures in place.  Mild   swelling and minimal bruising.  Range of motion of fingers still limited   secondary to pain.    PLAN:  I have elected to leave the sutures in for an additional week.  I would   like him to start some therapy.  We will order some OT through [facility].    I also want him to start some exercise at home for gentle range of motion both   passive and active.  He was given a wrist splint today for part-time use.  He   can have it off at home for light activities.  No heavy lifting.  Routine wound   care.  Follow up in one week for suture removal.      TEVIN  dd: 03/19/2018 14:07:55 (CDT)  td: 03/20/2018 10:50:04 (CDT)  Doc ID   #[id number]  Job ID #[id number]    CC:

## 2018-03-27 ENCOUNTER — TELEPHONE (OUTPATIENT)
Dept: REHABILITATION | Facility: HOSPITAL | Age: 63
End: 2018-03-27

## 2018-03-27 ENCOUNTER — OFFICE VISIT (OUTPATIENT)
Dept: ORTHOPEDICS | Facility: CLINIC | Age: 63
End: 2018-03-27
Payer: COMMERCIAL

## 2018-03-27 DIAGNOSIS — S64.12XD INJURY OF LEFT MEDIAN NERVE AT WRIST, SUBSEQUENT ENCOUNTER: Primary | ICD-10-CM

## 2018-03-27 PROCEDURE — 99999 PR PBB SHADOW E&M-EST. PATIENT-LVL II: CPT | Mod: PBBFAC,,, | Performed by: ORTHOPAEDIC SURGERY

## 2018-03-27 PROCEDURE — 99024 POSTOP FOLLOW-UP VISIT: CPT | Mod: S$GLB,,, | Performed by: ORTHOPAEDIC SURGERY

## 2018-03-27 NOTE — PROGRESS NOTES
HISTORY OF PRESENT ILLNESS:  Mr. Pryor is about two weeks out from a nerve   graft to the left wrist median nerve.  He also had tenolysis of multiple   tendons.    He is scheduled for therapy tomorrow.  He is doing well.    PHYSICAL EXAMINATION:  LEFT HAND AND WRIST:  Incision looks good, well healed.  Sutures are in place.    No evidence of infection.  Range of motion of fingers limited.  Sensation   remains the same.    PLAN:  Sutures removed.  Instructed in appropriate wound care.  Follow up in   therapy tomorrow.  I do want him to start movement as soon as possible, continue   with use of the brace when out of the house.  Norco given for pain.  Follow up   in three weeks.      TEVIN  dd: 03/27/2018 10:59:31 (CDT)  td: 03/28/2018 07:31:30 (CDT)  Doc ID   #[id number]  Job ID #[id number]    CC:

## 2018-03-29 ENCOUNTER — TELEPHONE (OUTPATIENT)
Dept: ORTHOPEDICS | Facility: CLINIC | Age: 63
End: 2018-03-29

## 2018-03-29 NOTE — TELEPHONE ENCOUNTER
----- Message from Letty Boswell sent at 3/29/2018 12:12 PM CDT -----  Contact: self / [phone]  Patient is requesting a call back regarding his therapy. Please advise

## 2018-03-29 NOTE — TELEPHONE ENCOUNTER
----- Message from Graciela Chappell sent at 3/29/2018  3:33 PM CDT -----  Contact: Pt  Pt is calling to speak with nurse in regards to faxed received from insurance company and can be reached at [phone].    Thank you

## 2018-03-29 NOTE — TELEPHONE ENCOUNTER
I spoke with the patient and informed him that we did not get the list. He was given the fax number and stated he will give them a call back.

## 2018-04-02 ENCOUNTER — TELEPHONE (OUTPATIENT)
Dept: ORTHOPEDICS | Facility: CLINIC | Age: 63
End: 2018-04-02

## 2018-04-02 NOTE — TELEPHONE ENCOUNTER
I spoke with the patient and informed him that we will fax over the order for therapy. He understood.

## 2018-04-02 NOTE — TELEPHONE ENCOUNTER
----- Message from Liyah Camacho MA sent at 4/2/2018  1:33 PM CDT -----  Contact: LILIYA RIOS SR. [[id number]]      ----- Message -----  From: Megan Alaniz  Sent: 4/2/2018   1:21 PM  To: Tu SOLORZANO Staff          Name of Who is Calling: LILIYA RIOS SR. [[id number]]      What is the request in detail: Patient is calling to get set up for his PT. Please call him.       Can the clinic reply by [facility]: No      [facility] [phone]

## 2018-04-05 ENCOUNTER — TELEPHONE (OUTPATIENT)
Dept: ORTHOPEDICS | Facility: CLINIC | Age: 63
End: 2018-04-05

## 2018-04-05 NOTE — TELEPHONE ENCOUNTER
Pt called stating to send information to [facility]. Called [facility] for fax # but no answer, LM with call back number. Jovannyo # [phone].

## 2018-04-05 NOTE — TELEPHONE ENCOUNTER
----- Message from Mali Mulligan sent at 4/5/2018  3:43 PM CDT -----  Contact: Self/ [phone]  Patient called in to let you know he spoke with his insurance about getting therapy. Patient needs you to call his insurance as well.    Please advise.

## 2018-04-05 NOTE — TELEPHONE ENCOUNTER
I spoke with the patient and informed him that we got the therapy place from his insurance company. I advised him to give them a call to find out where he could go for therapy. Ms. Maura tried calling them also.

## 2018-04-05 NOTE — TELEPHONE ENCOUNTER
----- Message from Fabiana Hopkins sent at 4/5/2018  3:01 PM CDT -----  Contact: [phone]/self  Patient is requesting a call back. The therapist doesn't take his insurance. He needs another referral for therapy. Please advise.

## 2018-04-05 NOTE — TELEPHONE ENCOUNTER
I spoke with the patient and informed him that we will resend the fax but it was faxed on 4/2/18. He understood.

## 2018-04-05 NOTE — TELEPHONE ENCOUNTER
----- Message from Arjun Clark MA sent at 4/5/2018  9:33 AM CDT -----  Contact: Pt  Pt called and would like a call back from the nurse asap.      Pt can be reached at [phone].    Thanks

## 2018-04-06 ENCOUNTER — TELEPHONE (OUTPATIENT)
Dept: ORTHOPEDICS | Facility: CLINIC | Age: 63
End: 2018-04-06

## 2018-04-06 NOTE — TELEPHONE ENCOUNTER
Spoke with [facility], provided staff with fax #. Orders faxed to facility. Fax confirmation received 3:15pm 4/6/18.

## 2018-05-14 ENCOUNTER — OFFICE VISIT (OUTPATIENT)
Dept: ORTHOPEDICS | Facility: CLINIC | Age: 63
End: 2018-05-14
Payer: COMMERCIAL

## 2018-05-14 VITALS — BODY MASS INDEX: 22.22 KG/M2 | WEIGHT: 150 LBS | HEIGHT: 69 IN

## 2018-05-14 DIAGNOSIS — S64.12XD INJURY OF LEFT MEDIAN NERVE AT WRIST, SUBSEQUENT ENCOUNTER: Primary | ICD-10-CM

## 2018-05-14 PROCEDURE — 99999 PR PBB SHADOW E&M-EST. PATIENT-LVL III: CPT | Mod: PBBFAC,,, | Performed by: ORTHOPAEDIC SURGERY

## 2018-05-14 PROCEDURE — 99024 POSTOP FOLLOW-UP VISIT: CPT | Mod: S$GLB,,, | Performed by: ORTHOPAEDIC SURGERY

## 2018-05-14 NOTE — PROGRESS NOTES
HISTORY OF PRESENT ILLNESS:  Mr. Pryor in followup of median nerve graft, left   wrist.  He is doing well.  He is currently in therapy.  He had excision of   neuroma in [place] and he is about two months' postop.    PHYSICAL EXAMINATION:  LEFT HAND AND WRIST:  Still has a lot of stiffness of the index and middle   fingers.  Incision well healed.  Tinel sign negative at the wrist.  Range of   motion decreased.   strength decreased.  Sensation decreased as well.    PLAN:  I think he would benefit from some Pennsaid topical anti-inflammatory   cream.  I have ordered that for the left hand and wrist.  Continue therapy at   [facility].  He seems to be making some progress.  He can discontinue the wrist brace   now.  Increase activities as tolerated.  Follow up in one month.      TEVIN  dd: 05/14/2018 10:22:01 (CDT)  td: 05/15/2018 06:30:22 (PIERRET)  Doc ID   #[id number]  Job ID #[id number]    CC:

## 2018-05-31 ENCOUNTER — TELEPHONE (OUTPATIENT)
Dept: ORTHOPEDICS | Facility: CLINIC | Age: 63
End: 2018-05-31

## 2018-05-31 NOTE — TELEPHONE ENCOUNTER
----- Message from Alba Malik sent at 5/31/2018  9:09 AM CDT -----  Contact: [phone] Sena Shetty with [facility] is requesting visit notes and op report (if he had an operation) Their fax number is [phone]. Please advise.

## 2018-06-14 ENCOUNTER — OFFICE VISIT (OUTPATIENT)
Dept: ORTHOPEDICS | Facility: CLINIC | Age: 63
End: 2018-06-14
Payer: COMMERCIAL

## 2018-06-14 DIAGNOSIS — S64.12XD INJURY OF LEFT MEDIAN NERVE AT WRIST, SUBSEQUENT ENCOUNTER: Primary | ICD-10-CM

## 2018-06-14 PROCEDURE — 99024 POSTOP FOLLOW-UP VISIT: CPT | Mod: S$GLB,,, | Performed by: ORTHOPAEDIC SURGERY

## 2018-06-14 PROCEDURE — 99999 PR PBB SHADOW E&M-EST. PATIENT-LVL II: CPT | Mod: PBBFAC,,, | Performed by: ORTHOPAEDIC SURGERY

## 2018-06-14 RX ORDER — DICLOFENAC SODIUM 20 MG/G
40 SOLUTION TOPICAL 2 TIMES DAILY
Qty: 1 BOTTLE | Refills: 1 | Status: SHIPPED | OUTPATIENT
Start: 2018-06-14

## 2018-06-14 RX ORDER — OXYCODONE AND ACETAMINOPHEN 7.5; 325 MG/1; MG/1
1 TABLET ORAL EVERY 12 HOURS PRN
Qty: 60 TABLET | Refills: 0 | Status: SHIPPED | OUTPATIENT
Start: 2018-06-14 | End: 2018-08-14

## 2018-06-14 NOTE — PROGRESS NOTES
HISTORY OF PRESENT ILLNESS:  Mr. 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  He is little over three months out surgery.    He really does not have any return of sensation, but he is getting better   function in the hand and therapy is helping.    PHYSICAL EXAMINATION:  LEFT HAND AND WRIST:  The incision is well healed.  No evidence of infection.    No significant swelling.  Range of motion of finger is much improved.  Sensation   is still decreased and atrophy of the thenar muscle noted.    PLAN:  I reassured him that it may take another few months to see if he gets any   return of sensation, which is not guaranteed with this type of surgery.  The   function is improving, so I have recommended that he continue with therapy,   continue with the dynamic extension splint and follow up in 1-2 months.      TEVIN  dd: 06/14/2018 10:22:55 (CDT)  td: 06/15/2018 07:45:07 (CDT)  Doc ID   #[id number]  Job ID #[id number]    CC:

## 2018-08-14 ENCOUNTER — OFFICE VISIT (OUTPATIENT)
Dept: ORTHOPEDICS | Facility: CLINIC | Age: 63
End: 2018-08-14
Payer: COMMERCIAL

## 2018-08-14 VITALS — WEIGHT: 150 LBS | BODY MASS INDEX: 22.22 KG/M2 | HEIGHT: 69 IN

## 2018-08-14 DIAGNOSIS — S64.12XD INJURY OF LEFT MEDIAN NERVE AT WRIST, SUBSEQUENT ENCOUNTER: Primary | ICD-10-CM

## 2018-08-14 PROCEDURE — 99213 OFFICE O/P EST LOW 20 MIN: CPT | Mod: S$GLB,,, | Performed by: ORTHOPAEDIC SURGERY

## 2018-08-14 PROCEDURE — 99999 PR PBB SHADOW E&M-EST. PATIENT-LVL III: CPT | Mod: PBBFAC,,, | Performed by: ORTHOPAEDIC SURGERY

## 2018-08-14 NOTE — PROGRESS NOTES
HISTORY OF PRESENT ILLNESS:  Mr. Pryor in followup allograft nerve repair to   the median nerve, left wrist.  He is a little over four months out.  He does   report that the sensation has improved a bit recently.    PHYSICAL EXAMINATION:  LEFT HAND AND WRIST:  The incision is well healed.  Minimal swelling.  No   evidence of infection.  Range of motion of fingers slightly decreased.  He has   pretty good flexion of all digits, but slight flexion contracture of the left   index and left middle.  Sensation is better in the palm and also the middle,   ring and small finger, but still decreased left index and thumb.    PLAN:  I have recommended that he continue with home exercise program including   strengthening with a squeeze ball, Voltaren gel topically as needed and follow   up in 2-3 months.      TEVIN  dd: 08/14/2018 09:56:14 (CDT)  td: 08/14/2018 14:43:22 (CDT)  Doc ID   #[id number]  Job ID #[id number]    CC:

## 2018-10-25 NOTE — TELEPHONE ENCOUNTER
----- Message from Fabiana Hopkins sent at 3/1/2018 10:21 AM CST -----  Contact: [phone]/self  Patient called in returning your call. Set him up for any appointment (earlier the better) and he will come in. Leave a message on the phone if he doesn't answer Please advise.       
I spoke with the patient and made him a followup appointment. He was made aware of date, time and location.  He also spoke with Dr. Mae about his results.  
Improved.

## 2019-03-10 ENCOUNTER — PATIENT MESSAGE (OUTPATIENT)
Dept: ORTHOPEDICS | Facility: CLINIC | Age: 64
End: 2019-03-10

## 2021-04-28 ENCOUNTER — PATIENT MESSAGE (OUTPATIENT)
Dept: RESEARCH | Facility: HOSPITAL | Age: 66
End: 2021-04-28

## 2021-05-25 ENCOUNTER — OFFICE VISIT (OUTPATIENT)
Dept: ORTHOPEDICS | Facility: CLINIC | Age: 66
End: 2021-05-25
Payer: MEDICARE

## 2021-05-25 ENCOUNTER — HOSPITAL ENCOUNTER (OUTPATIENT)
Dept: RADIOLOGY | Facility: HOSPITAL | Age: 66
Discharge: HOME | End: 2021-05-25
Attending: ORTHOPAEDIC SURGERY
Payer: MEDICARE

## 2021-05-25 ENCOUNTER — TELEPHONE (OUTPATIENT)
Dept: ORTHOPEDICS | Facility: CLINIC | Age: 66
End: 2021-05-25

## 2021-05-25 DIAGNOSIS — M79.642 LEFT HAND PAIN: Primary | ICD-10-CM

## 2021-05-25 DIAGNOSIS — M79.642 LEFT HAND PAIN: ICD-10-CM

## 2021-05-25 DIAGNOSIS — S64.12XS INJURY OF LEFT MEDIAN NERVE AT WRIST, SEQUELA: Primary | ICD-10-CM

## 2021-05-25 PROCEDURE — 99999 PR PBB SHADOW E&M-EST. PATIENT-LVL II: ICD-10-PCS | Mod: PBBFAC,,, | Performed by: ORTHOPAEDIC SURGERY

## 2021-05-25 PROCEDURE — 99999 PR PBB SHADOW E&M-EST. PATIENT-LVL II: CPT | Mod: PBBFAC,,, | Performed by: ORTHOPAEDIC SURGERY

## 2021-05-25 PROCEDURE — 73120 X-RAY EXAM OF HAND: CPT | Mod: TC,PN,LT

## 2021-05-25 PROCEDURE — 1125F AMNT PAIN NOTED PAIN PRSNT: CPT | Mod: S$GLB,,, | Performed by: ORTHOPAEDIC SURGERY

## 2021-05-25 PROCEDURE — 99213 PR OFFICE/OUTPT VISIT, EST, LEVL III, 20-29 MIN: ICD-10-PCS | Mod: S$GLB,,, | Performed by: ORTHOPAEDIC SURGERY

## 2021-05-25 PROCEDURE — 73120 X-RAY EXAM OF HAND: CPT | Mod: 26,LT,, | Performed by: RADIOLOGY

## 2021-05-25 PROCEDURE — 73120 XR HAND 2 VIEW LEFT: ICD-10-PCS | Mod: 26,LT,, | Performed by: RADIOLOGY

## 2021-05-25 PROCEDURE — 1125F PR PAIN SEVERITY QUANTIFIED, PAIN PRESENT: ICD-10-PCS | Mod: S$GLB,,, | Performed by: ORTHOPAEDIC SURGERY

## 2021-05-25 PROCEDURE — 99213 OFFICE O/P EST LOW 20 MIN: CPT | Mod: S$GLB,,, | Performed by: ORTHOPAEDIC SURGERY

## 2021-05-25 RX ORDER — DICLOFENAC SODIUM 10 MG/G
2 GEL TOPICAL 3 TIMES DAILY
Qty: 1 TUBE | Refills: 3 | Status: SHIPPED | OUTPATIENT
Start: 2021-05-25

## 2021-07-22 ENCOUNTER — OFFICE VISIT (OUTPATIENT)
Dept: ORTHOPEDICS | Facility: CLINIC | Age: 66
End: 2021-07-22
Payer: MEDICARE

## 2021-07-22 ENCOUNTER — TELEPHONE (OUTPATIENT)
Dept: ORTHOPEDICS | Facility: CLINIC | Age: 66
End: 2021-07-22

## 2021-07-22 VITALS — BODY MASS INDEX: 22.15 KG/M2 | WEIGHT: 150 LBS

## 2021-07-22 DIAGNOSIS — S64.12XS INJURY OF LEFT MEDIAN NERVE AT WRIST, SEQUELA: Primary | ICD-10-CM

## 2021-07-22 PROCEDURE — 99213 OFFICE O/P EST LOW 20 MIN: CPT | Mod: S$GLB,,, | Performed by: ORTHOPAEDIC SURGERY

## 2021-07-22 PROCEDURE — 99213 PR OFFICE/OUTPT VISIT, EST, LEVL III, 20-29 MIN: ICD-10-PCS | Mod: S$GLB,,, | Performed by: ORTHOPAEDIC SURGERY

## 2021-07-22 PROCEDURE — 1101F PR PT FALLS ASSESS DOC 0-1 FALLS W/OUT INJ PAST YR: ICD-10-PCS | Mod: CPTII,S$GLB,, | Performed by: ORTHOPAEDIC SURGERY

## 2021-07-22 PROCEDURE — 1101F PT FALLS ASSESS-DOCD LE1/YR: CPT | Mod: CPTII,S$GLB,, | Performed by: ORTHOPAEDIC SURGERY

## 2021-07-22 PROCEDURE — 3288F PR FALLS RISK ASSESSMENT DOCUMENTED: ICD-10-PCS | Mod: CPTII,S$GLB,, | Performed by: ORTHOPAEDIC SURGERY

## 2021-07-22 PROCEDURE — 1125F AMNT PAIN NOTED PAIN PRSNT: CPT | Mod: CPTII,S$GLB,, | Performed by: ORTHOPAEDIC SURGERY

## 2021-07-22 PROCEDURE — 3008F PR BODY MASS INDEX (BMI) DOCUMENTED: ICD-10-PCS | Mod: CPTII,S$GLB,, | Performed by: ORTHOPAEDIC SURGERY

## 2021-07-22 PROCEDURE — 99999 PR PBB SHADOW E&M-EST. PATIENT-LVL II: ICD-10-PCS | Mod: PBBFAC,,, | Performed by: ORTHOPAEDIC SURGERY

## 2021-07-22 PROCEDURE — 3008F BODY MASS INDEX DOCD: CPT | Mod: CPTII,S$GLB,, | Performed by: ORTHOPAEDIC SURGERY

## 2021-07-22 PROCEDURE — 1125F PR PAIN SEVERITY QUANTIFIED, PAIN PRESENT: ICD-10-PCS | Mod: CPTII,S$GLB,, | Performed by: ORTHOPAEDIC SURGERY

## 2021-07-22 PROCEDURE — 3288F FALL RISK ASSESSMENT DOCD: CPT | Mod: CPTII,S$GLB,, | Performed by: ORTHOPAEDIC SURGERY

## 2021-07-22 PROCEDURE — 99999 PR PBB SHADOW E&M-EST. PATIENT-LVL II: CPT | Mod: PBBFAC,,, | Performed by: ORTHOPAEDIC SURGERY

## 2023-02-08 ENCOUNTER — PATIENT OUTREACH (OUTPATIENT)
Dept: ADMINISTRATIVE | Facility: HOSPITAL | Age: 68
End: 2023-02-08
Payer: MEDICARE

## 2023-02-08 NOTE — LETTER
AUTHORIZATION FOR RELEASE OF   CONFIDENTIAL INFORMATION    Dear [facility],    We are seeing Krzysztof Pryor Sr., date of birth 1955, in the clinic at [facility]. Krzysztof Pryor Sr. has an outstanding lab/procedure at the time we reviewed his chart. In order to help keep his health information updated, he has authorized us to request the following medical record(s):        (  )  MAMMOGRAM                                      ( X )  COLONOSCOPY      (  )  PAP SMEAR                                          (  )  OUTSIDE LAB RESULTS     (  )  DEXA SCAN                                          (  )  EYE EXAM            (  )  FOOT EXAM                                          (  )  ENTIRE RECORD     (  )  OUTSIDE IMMUNIZATIONS                 (  )  _______________         Please fax records to [facility], [phone]     If you have any questions, please contact Shantal Clark at [phone].           Patient Name: Krzysztof Pryor Sr.  : 1955  Patient Phone #: [phone]

## 2023-02-14 ENCOUNTER — PATIENT OUTREACH (OUTPATIENT)
Dept: ADMINISTRATIVE | Facility: HOSPITAL | Age: 68
End: 2023-02-14
Payer: MEDICARE

## 2023-09-18 ENCOUNTER — TELEPHONE (OUTPATIENT)
Dept: ORTHOPEDICS | Facility: CLINIC | Age: 68
End: 2023-09-18
Payer: MEDICARE

## 2023-09-18 NOTE — TELEPHONE ENCOUNTER
REYM for pt inquiring about his prior treatment with Dr. Mae in [place]. Requested a call back to the [facility] at [phone] with any questions, concerns or need for a different appointment time with .

## 2023-10-12 ENCOUNTER — TELEPHONE (OUTPATIENT)
Dept: ORTHOPEDICS | Facility: CLINIC | Age: 68
End: 2023-10-12
Payer: MEDICARE

## 2023-10-12 DIAGNOSIS — M79.642 LEFT HAND PAIN: Primary | ICD-10-CM

## 2023-10-12 NOTE — TELEPHONE ENCOUNTER
----- Message from Carmencita Fuller sent at 10/12/2023 11:08 AM CDT -----  Ifrah Lorenzo, he is on our scheduled but he is a former Dr Mae patient, can you reach out to him and get him scheduled with you.        Thank you,    Carmencita Fuller ATC,OTC  Clinical/OR Assistant to Shaylee Martinez MD  [facility]  [phone] o.  [phone] fax

## 2023-10-16 ENCOUNTER — TELEPHONE (OUTPATIENT)
Dept: ORTHOPEDICS | Facility: CLINIC | Age: 68
End: 2023-10-16
Payer: MEDICARE

## 2023-12-21 ENCOUNTER — OFFICE VISIT (OUTPATIENT)
Dept: ORTHOPEDICS | Facility: CLINIC | Age: 68
End: 2023-12-21
Payer: MEDICARE

## 2023-12-21 VITALS — HEIGHT: 69 IN | BODY MASS INDEX: 22.22 KG/M2 | WEIGHT: 150 LBS

## 2023-12-21 DIAGNOSIS — R29.898 HAND WEAKNESS: Primary | ICD-10-CM

## 2023-12-21 DIAGNOSIS — S64.12XS INJURY OF LEFT MEDIAN NERVE AT WRIST, SEQUELA: ICD-10-CM

## 2023-12-21 PROCEDURE — 4010F PR ACE/ARB THEARPY RXD/TAKEN: ICD-10-PCS | Mod: HCNC,CPTII,S$GLB, | Performed by: ORTHOPAEDIC SURGERY

## 2023-12-21 PROCEDURE — 99999 PR PBB SHADOW E&M-EST. PATIENT-LVL III: ICD-10-PCS | Mod: PBBFAC,HCNC,, | Performed by: ORTHOPAEDIC SURGERY

## 2023-12-21 PROCEDURE — 99213 PR OFFICE/OUTPT VISIT, EST, LEVL III, 20-29 MIN: ICD-10-PCS | Mod: HCNC,S$GLB,, | Performed by: ORTHOPAEDIC SURGERY

## 2023-12-21 PROCEDURE — 3288F FALL RISK ASSESSMENT DOCD: CPT | Mod: HCNC,CPTII,S$GLB, | Performed by: ORTHOPAEDIC SURGERY

## 2023-12-21 PROCEDURE — 3288F PR FALLS RISK ASSESSMENT DOCUMENTED: ICD-10-PCS | Mod: HCNC,CPTII,S$GLB, | Performed by: ORTHOPAEDIC SURGERY

## 2023-12-21 PROCEDURE — 99213 OFFICE O/P EST LOW 20 MIN: CPT | Mod: HCNC,S$GLB,, | Performed by: ORTHOPAEDIC SURGERY

## 2023-12-21 PROCEDURE — 3008F PR BODY MASS INDEX (BMI) DOCUMENTED: ICD-10-PCS | Mod: HCNC,CPTII,S$GLB, | Performed by: ORTHOPAEDIC SURGERY

## 2023-12-21 PROCEDURE — 3008F BODY MASS INDEX DOCD: CPT | Mod: HCNC,CPTII,S$GLB, | Performed by: ORTHOPAEDIC SURGERY

## 2023-12-21 PROCEDURE — 1101F PT FALLS ASSESS-DOCD LE1/YR: CPT | Mod: HCNC,CPTII,S$GLB, | Performed by: ORTHOPAEDIC SURGERY

## 2023-12-21 PROCEDURE — 1125F PR PAIN SEVERITY QUANTIFIED, PAIN PRESENT: ICD-10-PCS | Mod: HCNC,CPTII,S$GLB, | Performed by: ORTHOPAEDIC SURGERY

## 2023-12-21 PROCEDURE — 1101F PR PT FALLS ASSESS DOC 0-1 FALLS W/OUT INJ PAST YR: ICD-10-PCS | Mod: HCNC,CPTII,S$GLB, | Performed by: ORTHOPAEDIC SURGERY

## 2023-12-21 PROCEDURE — 1159F PR MEDICATION LIST DOCUMENTED IN MEDICAL RECORD: ICD-10-PCS | Mod: HCNC,CPTII,S$GLB, | Performed by: ORTHOPAEDIC SURGERY

## 2023-12-21 PROCEDURE — 1159F MED LIST DOCD IN RCRD: CPT | Mod: HCNC,CPTII,S$GLB, | Performed by: ORTHOPAEDIC SURGERY

## 2023-12-21 PROCEDURE — 99999 PR PBB SHADOW E&M-EST. PATIENT-LVL III: CPT | Mod: PBBFAC,HCNC,, | Performed by: ORTHOPAEDIC SURGERY

## 2023-12-21 PROCEDURE — 4010F ACE/ARB THERAPY RXD/TAKEN: CPT | Mod: HCNC,CPTII,S$GLB, | Performed by: ORTHOPAEDIC SURGERY

## 2023-12-21 PROCEDURE — 1125F AMNT PAIN NOTED PAIN PRSNT: CPT | Mod: HCNC,CPTII,S$GLB, | Performed by: ORTHOPAEDIC SURGERY

## 2023-12-21 RX ORDER — GABAPENTIN 300 MG/1
300 CAPSULE ORAL NIGHTLY
Qty: 30 CAPSULE | Refills: 1 | Status: SHIPPED | OUTPATIENT
Start: 2023-12-21 | End: 2024-12-20

## 2023-12-21 NOTE — PROGRESS NOTES
"Subjective:      Patient ID: Krzysztof Pryor Sr. is a 68 y.o. male.  Chief Complaint: Pain of the Left Hand (Pt states pinky, ring ,and middle )      HPI  Krzysztof Pryor Sr. is a  68 y.o. 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    Review of patient's allergies indicates:   Allergen Reactions    Iodine and iodide containing products Hives         Current Outpatient Medications   Medication Sig Dispense Refill    amlodipine (NORVASC) 10 MG tablet Take 1 tablet (10 mg total) by mouth once daily. 30 tablet 11    atorvastatin (LIPITOR) 40 MG tablet Take 1 tablet (40 mg total) by mouth once daily. 30 tablet 11    diclofenac sodium (PENNSAID) 20 mg/gram /actuation(2 %) sopm Apply 40 mg topically 2 (two) times daily. 1 Bottle 1    diclofenac sodium (VOLTAREN) 1 % Gel Apply 2 g topically 3 (three) times daily. 1 Tube 3    triamterene-hydrochlorothiazide 37.5-25 mg (MAXZIDE-25) 37.5-25 mg per tablet Take 1 tablet by mouth once daily. 30 tablet 5     Current Facility-Administered Medications   Medication Dose Route Frequency Provider Last Rate Last Admin    tetanus and diphther. tox (PF)(TD)  0.5 mL Intramuscular Once Quinton Tucker MD           Past Medical History:   Diagnosis Date    Hypertension        No past surgical history on file.    OBJECTIVE:   PHYSICAL EXAM:  Height: 5' 9" (175.3 cm) Weight: 68 kg (150 lb)  Vitals:    12/21/23 1456   Weight: 68 kg (150 lb)   Height: 5' 9" (1.753 m)   PainSc:   6     Ortho/SPM Exam  Examination left hand there is significant thenar atrophy in the palm range of motion fingers is excellent however  strength is " decreased   Tinel sign is negative at the wrist       RADIOGRAPHS:  None  Comments: I have personally reviewed the imaging and I agree with the above radiologist's report.    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 There may be typographical errors that have been missed during proof-reading.

## 2023-12-22 ENCOUNTER — TELEPHONE (OUTPATIENT)
Dept: ADMINISTRATIVE | Facility: HOSPITAL | Age: 68
End: 2023-12-22
Payer: MEDICARE

## 2023-12-26 ENCOUNTER — TELEPHONE (OUTPATIENT)
Dept: ADMINISTRATIVE | Facility: HOSPITAL | Age: 68
End: 2023-12-26
Payer: MEDICARE

## (undated) DEVICE — NDL HYPO REG 25G X 1 1/2

## (undated) DEVICE — SLING ARM COMFT NAVY BLU LG

## (undated) DEVICE — SEE MEDLINE ITEM 156955

## (undated) DEVICE — GLOVE SURG BIOGEL LATEX SZ 7.5

## (undated) DEVICE — SEE MEDLINE ITEM 157116

## (undated) DEVICE — BLADE SURG #15 CARBON STEEL

## (undated) DEVICE — BANDAGE ACE NON LATEX 3IN

## (undated) DEVICE — PADDING CAST SPECIALIST 3X4YD

## (undated) DEVICE — BANDAGE ACE NON LATEX 2IN

## (undated) DEVICE — SEE MEDLINE ITEM 157173

## (undated) DEVICE — SUT 4-0 ETHILON 18 PS-2

## (undated) DEVICE — BLADE SCALP OPHTL BEVEL STR

## (undated) DEVICE — BANDAGE ELASTIC 2X5 VELCRO ST

## (undated) DEVICE — PACK BASIC

## (undated) DEVICE — DRESSING XEROFORM FOIL PK 1X8

## (undated) DEVICE — GAUZE SPONGE 4'X4 12 PLY

## (undated) DEVICE — APPLICATOR CHLORAPREP ORN 26ML

## (undated) DEVICE — SEE MEDLINE ITEM 152522

## (undated) DEVICE — CAST PLSTR SPLINT X-FAST 3X15

## (undated) DEVICE — SEE MEDLINE ITEM 157131

## (undated) DEVICE — PAD PREP 50/CA

## (undated) DEVICE — PADDING CAST 2IN DELTA ROLL

## (undated) DEVICE — SEE MEDLINE ITEM 157117

## (undated) DEVICE — MANIFOLD 4 PORT

## (undated) DEVICE — SUT PROLENE 6-0 24 BV-1

## (undated) DEVICE — STOCKINET 4INX48

## (undated) DEVICE — COVER OVERHEAD SURG LT BLUE

## (undated) DEVICE — SUT 8/0 5IN ETHILON BLK MO

## (undated) DEVICE — SYR B-D DISP CONTROL 10CC100/C

## (undated) DEVICE — GAUZE SPONGE 4X4 12PLY